# Patient Record
Sex: MALE | Race: WHITE | Employment: FULL TIME | URBAN - METROPOLITAN AREA
[De-identification: names, ages, dates, MRNs, and addresses within clinical notes are randomized per-mention and may not be internally consistent; named-entity substitution may affect disease eponyms.]

---

## 2020-02-07 ENCOUNTER — HOSPITAL ENCOUNTER (OUTPATIENT)
Dept: ULTRASOUND IMAGING | Age: 45
Discharge: HOME | End: 2020-02-07
Payer: COMMERCIAL

## 2020-02-07 PROCEDURE — 76705 ECHO EXAM OF ABDOMEN: CPT

## 2021-01-07 ENCOUNTER — HOSPITAL ENCOUNTER (INPATIENT)
Age: 46
LOS: 4 days | Discharge: HOME | DRG: 439 | End: 2021-01-11
Attending: EMERGENCY MEDICINE | Admitting: INTERNAL MEDICINE
Payer: COMMERCIAL

## 2021-01-07 ENCOUNTER — APPOINTMENT (OUTPATIENT)
Dept: CT IMAGING | Age: 46
DRG: 439 | End: 2021-01-07
Payer: COMMERCIAL

## 2021-01-07 ENCOUNTER — APPOINTMENT (OUTPATIENT)
Dept: ULTRASOUND IMAGING | Age: 46
DRG: 439 | End: 2021-01-07
Payer: COMMERCIAL

## 2021-01-07 DIAGNOSIS — K85.80 OTHER ACUTE PANCREATITIS WITHOUT INFECTION OR NECROSIS: ICD-10-CM

## 2021-01-07 DIAGNOSIS — R73.9 HYPERGLYCEMIA: ICD-10-CM

## 2021-01-07 DIAGNOSIS — K85.90 ACUTE PANCREATITIS, UNSPECIFIED COMPLICATION STATUS, UNSPECIFIED PANCREATITIS TYPE: Primary | ICD-10-CM

## 2021-01-07 PROBLEM — K85.00 IDIOPATHIC ACUTE PANCREATITIS: Status: ACTIVE | Noted: 2021-01-07

## 2021-01-07 LAB
A/G RATIO: 1.6 (ref 1.1–2.2)
ALBUMIN SERPL-MCNC: 4.6 G/DL (ref 3.4–5)
ALP BLD-CCNC: 98 U/L (ref 40–129)
ALT SERPL-CCNC: ABNORMAL U/L (ref 10–40)
ANION GAP SERPL CALCULATED.3IONS-SCNC: 18 MMOL/L (ref 3–16)
AST SERPL-CCNC: ABNORMAL U/L (ref 15–37)
BASOPHILS ABSOLUTE: 0.1 K/UL (ref 0–0.2)
BASOPHILS RELATIVE PERCENT: 0.6 %
BILIRUB SERPL-MCNC: 0.5 MG/DL (ref 0–1)
BUN BLDV-MCNC: 10 MG/DL (ref 7–20)
CALCIUM SERPL-MCNC: 9.3 MG/DL (ref 8.3–10.6)
CHLORIDE BLD-SCNC: 95 MMOL/L (ref 99–110)
CHOLESTEROL, TOTAL: 976 MG/DL (ref 0–199)
CO2: 17 MMOL/L (ref 21–32)
CREAT SERPL-MCNC: 0.6 MG/DL (ref 0.9–1.3)
EOSINOPHILS ABSOLUTE: 0 K/UL (ref 0–0.6)
EOSINOPHILS RELATIVE PERCENT: 0.3 %
GFR AFRICAN AMERICAN: >60
GFR NON-AFRICAN AMERICAN: >60
GLOBULIN: 2.9 G/DL
GLUCOSE BLD-MCNC: 242 MG/DL (ref 70–99)
GLUCOSE BLD-MCNC: 252 MG/DL (ref 70–99)
GLUCOSE BLD-MCNC: 272 MG/DL (ref 70–99)
GLUCOSE BLD-MCNC: 296 MG/DL (ref 70–99)
GLUCOSE BLD-MCNC: 329 MG/DL (ref 70–99)
HCT VFR BLD CALC: 48.4 % (ref 40.5–52.5)
HDLC SERPL-MCNC: 14 MG/DL (ref 40–60)
HEMOGLOBIN: 16.5 G/DL (ref 13.5–17.5)
LACTIC ACID: 1.4 MMOL/L (ref 0.4–2)
LDL CHOLESTEROL DIRECT: 28 MG/DL
LIPASE: 1299 U/L (ref 13–60)
LYMPHOCYTES ABSOLUTE: 1.5 K/UL (ref 1–5.1)
LYMPHOCYTES RELATIVE PERCENT: 10.4 %
MAGNESIUM: 2 MG/DL (ref 1.8–2.4)
MCH RBC QN AUTO: 28.5 PG (ref 26–34)
MCHC RBC AUTO-ENTMCNC: 34 G/DL (ref 31–36)
MCV RBC AUTO: 83.8 FL (ref 80–100)
MONOCYTES ABSOLUTE: 0.6 K/UL (ref 0–1.3)
MONOCYTES RELATIVE PERCENT: 4.2 %
NEUTROPHILS ABSOLUTE: 12.1 K/UL (ref 1.7–7.7)
NEUTROPHILS RELATIVE PERCENT: 84.5 %
PDW BLD-RTO: 13.5 % (ref 12.4–15.4)
PERFORMED ON: ABNORMAL
PHOSPHORUS: 3.5 MG/DL (ref 2.5–4.9)
PLATELET # BLD: 302 K/UL (ref 135–450)
PMV BLD AUTO: 7.5 FL (ref 5–10.5)
POTASSIUM REFLEX MAGNESIUM: 4.1 MMOL/L (ref 3.5–5.1)
RBC # BLD: 5.77 M/UL (ref 4.2–5.9)
SARS-COV-2, NAAT: NOT DETECTED
SODIUM BLD-SCNC: 130 MMOL/L (ref 136–145)
TOTAL PROTEIN: 7.5 G/DL (ref 6.4–8.2)
TRIGL SERPL-MCNC: >4425 MG/DL (ref 0–150)
WBC # BLD: 14.3 K/UL (ref 4–11)

## 2021-01-07 PROCEDURE — 36415 COLL VENOUS BLD VENIPUNCTURE: CPT

## 2021-01-07 PROCEDURE — 2500000003 HC RX 250 WO HCPCS: Performed by: EMERGENCY MEDICINE

## 2021-01-07 PROCEDURE — 6370000000 HC RX 637 (ALT 250 FOR IP): Performed by: INTERNAL MEDICINE

## 2021-01-07 PROCEDURE — U0002 COVID-19 LAB TEST NON-CDC: HCPCS

## 2021-01-07 PROCEDURE — 85025 COMPLETE CBC W/AUTO DIFF WBC: CPT

## 2021-01-07 PROCEDURE — 6360000002 HC RX W HCPCS: Performed by: PHYSICIAN ASSISTANT

## 2021-01-07 PROCEDURE — 99253 IP/OBS CNSLTJ NEW/EST LOW 45: CPT | Performed by: INTERNAL MEDICINE

## 2021-01-07 PROCEDURE — 96376 TX/PRO/DX INJ SAME DRUG ADON: CPT

## 2021-01-07 PROCEDURE — 96375 TX/PRO/DX INJ NEW DRUG ADDON: CPT

## 2021-01-07 PROCEDURE — 83036 HEMOGLOBIN GLYCOSYLATED A1C: CPT

## 2021-01-07 PROCEDURE — 99222 1ST HOSP IP/OBS MODERATE 55: CPT | Performed by: PHYSICIAN ASSISTANT

## 2021-01-07 PROCEDURE — 2580000003 HC RX 258: Performed by: INTERNAL MEDICINE

## 2021-01-07 PROCEDURE — 96374 THER/PROPH/DIAG INJ IV PUSH: CPT

## 2021-01-07 PROCEDURE — 6360000002 HC RX W HCPCS: Performed by: EMERGENCY MEDICINE

## 2021-01-07 PROCEDURE — 80053 COMPREHEN METABOLIC PANEL: CPT

## 2021-01-07 PROCEDURE — 6360000002 HC RX W HCPCS: Performed by: INTERNAL MEDICINE

## 2021-01-07 PROCEDURE — 83605 ASSAY OF LACTIC ACID: CPT

## 2021-01-07 PROCEDURE — 76705 ECHO EXAM OF ABDOMEN: CPT

## 2021-01-07 PROCEDURE — 74177 CT ABD & PELVIS W/CONTRAST: CPT

## 2021-01-07 PROCEDURE — 99284 EMERGENCY DEPT VISIT MOD MDM: CPT

## 2021-01-07 PROCEDURE — 84100 ASSAY OF PHOSPHORUS: CPT

## 2021-01-07 PROCEDURE — 82465 ASSAY BLD/SERUM CHOLESTEROL: CPT

## 2021-01-07 PROCEDURE — 1200000000 HC SEMI PRIVATE

## 2021-01-07 PROCEDURE — 83718 ASSAY OF LIPOPROTEIN: CPT

## 2021-01-07 PROCEDURE — 83690 ASSAY OF LIPASE: CPT

## 2021-01-07 PROCEDURE — 83721 ASSAY OF BLOOD LIPOPROTEIN: CPT

## 2021-01-07 PROCEDURE — C9113 INJ PANTOPRAZOLE SODIUM, VIA: HCPCS | Performed by: INTERNAL MEDICINE

## 2021-01-07 PROCEDURE — 6360000004 HC RX CONTRAST MEDICATION: Performed by: EMERGENCY MEDICINE

## 2021-01-07 PROCEDURE — 84478 ASSAY OF TRIGLYCERIDES: CPT

## 2021-01-07 PROCEDURE — 83735 ASSAY OF MAGNESIUM: CPT

## 2021-01-07 PROCEDURE — 2580000003 HC RX 258: Performed by: EMERGENCY MEDICINE

## 2021-01-07 RX ORDER — 0.9 % SODIUM CHLORIDE 0.9 %
1000 INTRAVENOUS SOLUTION INTRAVENOUS ONCE
Status: COMPLETED | OUTPATIENT
Start: 2021-01-07 | End: 2021-01-07

## 2021-01-07 RX ORDER — ACETAMINOPHEN 325 MG/1
650 TABLET ORAL EVERY 4 HOURS PRN
Status: DISCONTINUED | OUTPATIENT
Start: 2021-01-07 | End: 2021-01-11

## 2021-01-07 RX ORDER — MAGNESIUM SULFATE 1 G/100ML
1 INJECTION INTRAVENOUS PRN
Status: DISCONTINUED | OUTPATIENT
Start: 2021-01-07 | End: 2021-01-11 | Stop reason: HOSPADM

## 2021-01-07 RX ORDER — ONDANSETRON 2 MG/ML
4 INJECTION INTRAMUSCULAR; INTRAVENOUS EVERY 6 HOURS PRN
Status: DISCONTINUED | OUTPATIENT
Start: 2021-01-07 | End: 2021-01-11 | Stop reason: HOSPADM

## 2021-01-07 RX ORDER — ONDANSETRON 2 MG/ML
4 INJECTION INTRAMUSCULAR; INTRAVENOUS EVERY 6 HOURS PRN
Status: DISCONTINUED | OUTPATIENT
Start: 2021-01-07 | End: 2021-01-07

## 2021-01-07 RX ORDER — ONDANSETRON 2 MG/ML
4 INJECTION INTRAMUSCULAR; INTRAVENOUS ONCE
Status: COMPLETED | OUTPATIENT
Start: 2021-01-07 | End: 2021-01-07

## 2021-01-07 RX ORDER — SODIUM CHLORIDE 0.9 % (FLUSH) 0.9 %
10 SYRINGE (ML) INJECTION EVERY 12 HOURS SCHEDULED
Status: DISCONTINUED | OUTPATIENT
Start: 2021-01-07 | End: 2021-01-11 | Stop reason: HOSPADM

## 2021-01-07 RX ORDER — SODIUM CHLORIDE, SODIUM LACTATE, POTASSIUM CHLORIDE, CALCIUM CHLORIDE 600; 310; 30; 20 MG/100ML; MG/100ML; MG/100ML; MG/100ML
INJECTION, SOLUTION INTRAVENOUS CONTINUOUS
Status: DISCONTINUED | OUTPATIENT
Start: 2021-01-07 | End: 2021-01-10

## 2021-01-07 RX ORDER — PANTOPRAZOLE SODIUM 40 MG/10ML
40 INJECTION, POWDER, LYOPHILIZED, FOR SOLUTION INTRAVENOUS DAILY
Status: DISCONTINUED | OUTPATIENT
Start: 2021-01-07 | End: 2021-01-11 | Stop reason: HOSPADM

## 2021-01-07 RX ORDER — NICOTINE POLACRILEX 4 MG
15 LOZENGE BUCCAL PRN
Status: DISCONTINUED | OUTPATIENT
Start: 2021-01-07 | End: 2021-01-08

## 2021-01-07 RX ORDER — DEXTROSE MONOHYDRATE 50 MG/ML
100 INJECTION, SOLUTION INTRAVENOUS PRN
Status: DISCONTINUED | OUTPATIENT
Start: 2021-01-07 | End: 2021-01-08

## 2021-01-07 RX ORDER — MORPHINE SULFATE 4 MG/ML
4 INJECTION, SOLUTION INTRAMUSCULAR; INTRAVENOUS
Status: DISCONTINUED | OUTPATIENT
Start: 2021-01-07 | End: 2021-01-07

## 2021-01-07 RX ORDER — ESOMEPRAZOLE MAGNESIUM 20 MG/1
20 FOR SUSPENSION ORAL DAILY
COMMUNITY

## 2021-01-07 RX ORDER — SODIUM CHLORIDE 0.9 % (FLUSH) 0.9 %
10 SYRINGE (ML) INJECTION PRN
Status: DISCONTINUED | OUTPATIENT
Start: 2021-01-07 | End: 2021-01-11 | Stop reason: HOSPADM

## 2021-01-07 RX ORDER — DEXTROSE MONOHYDRATE 25 G/50ML
12.5 INJECTION, SOLUTION INTRAVENOUS PRN
Status: DISCONTINUED | OUTPATIENT
Start: 2021-01-07 | End: 2021-01-08

## 2021-01-07 RX ORDER — SODIUM CHLORIDE, SODIUM LACTATE, POTASSIUM CHLORIDE, CALCIUM CHLORIDE 600; 310; 30; 20 MG/100ML; MG/100ML; MG/100ML; MG/100ML
INJECTION, SOLUTION INTRAVENOUS CONTINUOUS
Status: DISPENSED | OUTPATIENT
Start: 2021-01-07 | End: 2021-01-07

## 2021-01-07 RX ORDER — MORPHINE SULFATE 4 MG/ML
6 INJECTION, SOLUTION INTRAMUSCULAR; INTRAVENOUS ONCE
Status: COMPLETED | OUTPATIENT
Start: 2021-01-07 | End: 2021-01-07

## 2021-01-07 RX ORDER — SODIUM CHLORIDE 9 MG/ML
1000 INJECTION, SOLUTION INTRAVENOUS CONTINUOUS
Status: DISCONTINUED | OUTPATIENT
Start: 2021-01-07 | End: 2021-01-07

## 2021-01-07 RX ADMIN — Medication 10 ML: at 08:24

## 2021-01-07 RX ADMIN — INSULIN LISPRO 2 UNITS: 100 INJECTION, SOLUTION INTRAVENOUS; SUBCUTANEOUS at 16:26

## 2021-01-07 RX ADMIN — SODIUM CHLORIDE 1000 ML: 9 INJECTION, SOLUTION INTRAVENOUS at 04:54

## 2021-01-07 RX ADMIN — ONDANSETRON HYDROCHLORIDE 4 MG: 2 INJECTION, SOLUTION INTRAMUSCULAR; INTRAVENOUS at 03:15

## 2021-01-07 RX ADMIN — HYDROMORPHONE HYDROCHLORIDE 1 MG: 1 INJECTION, SOLUTION INTRAMUSCULAR; INTRAVENOUS; SUBCUTANEOUS at 13:52

## 2021-01-07 RX ADMIN — PANTOPRAZOLE SODIUM 40 MG: 40 INJECTION, POWDER, FOR SOLUTION INTRAVENOUS at 08:29

## 2021-01-07 RX ADMIN — HYDROMORPHONE HYDROCHLORIDE 1 MG: 1 INJECTION, SOLUTION INTRAMUSCULAR; INTRAVENOUS; SUBCUTANEOUS at 08:21

## 2021-01-07 RX ADMIN — INSULIN LISPRO 3 UNITS: 100 INJECTION, SOLUTION INTRAVENOUS; SUBCUTANEOUS at 08:41

## 2021-01-07 RX ADMIN — HYDROMORPHONE HYDROCHLORIDE 1 MG: 1 INJECTION, SOLUTION INTRAMUSCULAR; INTRAVENOUS; SUBCUTANEOUS at 23:57

## 2021-01-07 RX ADMIN — INSULIN LISPRO 3 UNITS: 100 INJECTION, SOLUTION INTRAVENOUS; SUBCUTANEOUS at 22:15

## 2021-01-07 RX ADMIN — SODIUM CHLORIDE, POTASSIUM CHLORIDE, SODIUM LACTATE AND CALCIUM CHLORIDE: 600; 310; 30; 20 INJECTION, SOLUTION INTRAVENOUS at 13:58

## 2021-01-07 RX ADMIN — SODIUM CHLORIDE, POTASSIUM CHLORIDE, SODIUM LACTATE AND CALCIUM CHLORIDE: 600; 310; 30; 20 INJECTION, SOLUTION INTRAVENOUS at 20:54

## 2021-01-07 RX ADMIN — HYDROMORPHONE HYDROCHLORIDE 1 MG: 1 INJECTION, SOLUTION INTRAMUSCULAR; INTRAVENOUS; SUBCUTANEOUS at 18:52

## 2021-01-07 RX ADMIN — SODIUM CHLORIDE 1000 ML: 9 INJECTION, SOLUTION INTRAVENOUS at 03:15

## 2021-01-07 RX ADMIN — MORPHINE SULFATE 4 MG: 4 INJECTION INTRAVENOUS at 05:26

## 2021-01-07 RX ADMIN — IOPAMIDOL 75 ML: 755 INJECTION, SOLUTION INTRAVENOUS at 04:26

## 2021-01-07 RX ADMIN — SODIUM CHLORIDE, POTASSIUM CHLORIDE, SODIUM LACTATE AND CALCIUM CHLORIDE: 600; 310; 30; 20 INJECTION, SOLUTION INTRAVENOUS at 08:29

## 2021-01-07 RX ADMIN — INSULIN LISPRO 3 UNITS: 100 INJECTION, SOLUTION INTRAVENOUS; SUBCUTANEOUS at 12:56

## 2021-01-07 RX ADMIN — ENOXAPARIN SODIUM 40 MG: 40 INJECTION SUBCUTANEOUS at 08:29

## 2021-01-07 RX ADMIN — FAMOTIDINE 20 MG: 10 INJECTION, SOLUTION INTRAVENOUS at 03:15

## 2021-01-07 RX ADMIN — MORPHINE SULFATE 6 MG: 4 INJECTION INTRAVENOUS at 03:15

## 2021-01-07 ASSESSMENT — PAIN - FUNCTIONAL ASSESSMENT: PAIN_FUNCTIONAL_ASSESSMENT: PREVENTS OR INTERFERES SOME ACTIVE ACTIVITIES AND ADLS

## 2021-01-07 ASSESSMENT — PAIN DESCRIPTION - PROGRESSION
CLINICAL_PROGRESSION: GRADUALLY WORSENING
CLINICAL_PROGRESSION: GRADUALLY WORSENING

## 2021-01-07 ASSESSMENT — PAIN DESCRIPTION - ORIENTATION: ORIENTATION: LOWER

## 2021-01-07 ASSESSMENT — PAIN SCALES - GENERAL
PAINLEVEL_OUTOF10: 7
PAINLEVEL_OUTOF10: 8
PAINLEVEL_OUTOF10: 9
PAINLEVEL_OUTOF10: 6
PAINLEVEL_OUTOF10: 7
PAINLEVEL_OUTOF10: 8

## 2021-01-07 ASSESSMENT — PAIN DESCRIPTION - LOCATION
LOCATION: ABDOMEN
LOCATION: ABDOMEN

## 2021-01-07 ASSESSMENT — PAIN DESCRIPTION - FREQUENCY: FREQUENCY: INTERMITTENT

## 2021-01-07 ASSESSMENT — PAIN DESCRIPTION - PAIN TYPE
TYPE: ACUTE PAIN
TYPE: ACUTE PAIN

## 2021-01-07 ASSESSMENT — PAIN DESCRIPTION - DESCRIPTORS: DESCRIPTORS: THROBBING

## 2021-01-07 NOTE — CONSULTS
[address] [address]    [address] [facility] ,  [address] [facility]  Phone: [phone]   SNU:[phone]  [address] [place] [address] [address],  [address] [facility] Nabila Brandon  Phone: [phone]   DTE:[phone]    Gastroenterology H&P/Consult Note    Chief Complaint   Patient presents with    Abdominal Pain     upper abd pain that started approx 1500 yesterday. denies nausea or vomiting       HPI     Thank you No ref. provider found and Oscar Mills MD for asking me to see Israel Campo in consultation. He is a 39y.o. year old malewith medical history of obesity (BMI 35), diabetes mellitus type 2 diet controlled and GERD presents with epigastric abdominal pain of 1 day duration. Abdominal pain is in the epigastrium, constant, nonradiating and associated with nausea. Denies vomiting, fever, chills, unintentional weight loss, constipation, diarrhea, hematochezia or melenic stools. He reports occasional alcohol use -2 beers twice a week. He denies previous episodes of acute pancreatitis, NSAID use or medications except for Nexium as needed. He denies family history of pancreatitis or pancreatic cancer. Work-up has noted normal liver chemistries, elevated lipase 1300, leukocytosis WBC 14.3, hyperglycemia glucose 329, elevated anion gap 18, hemoglobin 16.5, hematocrit 48.4 platelets 340. COVID-19 negative. CT abdomen pelvis on 1/7/2021 acute pancreatitis with peripancreatic fluid and fat stranding. Fatty infiltration of liver and diverticulosis without diverticulitis. Ultrasound right upper quadrant on 1/7/2021 noted fatty infiltration of liver, normal gallbladder, normal common bile duct to 5 mm. GI has been consulted for further management of acute pancreatitis. Date of Admission:  1/7/2021  Date of Consultation:  1/7/2021      Last Encounter Reviewed: None  Pertinent PMH, FH, SH is reviewed below.   Last EGD: None  Last Colonoscopy: None    Health Maintenance   Topic Date Due    Hepatitis C screen  1975    HIV screen  05/07/1992    Lipid screen  05/07/2017    Diabetes screen  05/07/2017    Flu vaccine (1) 09/01/2020    DTaP/Tdap/Td vaccine (2 - Td) 02/05/2025    Hepatitis A vaccine  Aged Out    Hepatitis B vaccine  Aged Out    Hib vaccine  Aged Out    Meningococcal (ACWY) vaccine  Aged Out    Pneumococcal 0-64 years Vaccine  Aged Out     PAST MEDICAL HISTORY     Past Medical History:   Diagnosis Date    Diabetes mellitus ([address]     diet controlled     FAMILY HISTORY   No family history on file. SOCIAL HISTORY     Social History     Tobacco Use    Smoking status: Never Smoker   Substance Use Topics    Alcohol use: Yes     Comment: occasional    Drug use: Not Currently     SURGICAL HISTORY   No past surgical history on file. ALLERGIES     Allergies   Allergen Reactions    Penicillins Hives     CURRENT MEDICATIONS      pantoprazole  40 mg Intravenous Daily    sodium chloride flush  10 mL Intravenous 2 times per day    enoxaparin  40 mg Subcutaneous Daily    insulin lispro  0-6 Units Subcutaneous Q4H      lactated ringers 250 mL/hr at 01/07/21 9022    Followed by   Reyes lactated ringers      dextrose       sodium chloride flush, magnesium sulfate, acetaminophen, ondansetron, glucose, dextrose, glucagon (rDNA), dextrose, HYDROmorphone **OR** HYDROmorphone  HOME MEDICATIONS  [unfilled]  IMMUNIZATIONS     There is no immunization history on file for this patient. REVIEW OF SYSTEMS   See HPI for further details and pertinent postiives. Negative for the following:  Constitutional: Negative for weight change. Negative for appetite change and fatigue. HENT: Negative for nosebleeds, sore throat, mouth sores, trouble swallowing and voice change. Respiratory: Negative for cough, choking and chest tightness. Cardiovascular: Negative for chest pain   Gastrointestinal: See HPI  Musculoskeletal: Negative for arthralgias. Skin: Negative for pallor.    Neurological: Negative for weakness and light-headedness. Hematological: Negative for adenopathy. Does not bruise/bleed easily. Psychiatric/Behavioral: Negative for suicidal ideas. PHYSICAL EXAM   VITAL SIGNS: /82   Pulse 97   Temp 99.3 °F (37.4 °C) (Oral)   Resp 14   Ht 5' 8\" (1.727 m)   Wt 230 lb (104.3 kg)   SpO2 94%   BMI 34.97 kg/m²   With regards to weight, he reports stable / unchanged. Review of available records reveals: Wt Readings from Last 50 Encounters:   01/07/21 230 lb (104.3 kg)     Constitutional: Obese male, well developed, Well nourished, No acute distress, Non-toxic appearance. HENT: Normocephalic, Atraumatic, Bilateral external ears normal, Oropharynx moist, No oral exudates, Nose normal.   Eyes: Conjunctiva normal, No discharge. Neck: Normal range of motion, No tenderness, Supple, No stridor. Lymphatic: No cervical, subclavian, or axillary lymphadenopathy. Cardiovascular: Normal heart rate, Normal rhythm, No murmurs, No rubs, No gallops. Thorax & Lungs: Normal breath sounds, No respiratory distress, No wheezing, No chest tenderness. No gynecomastia. Abdomen: Pannus abdomen, normal bowel sounds, soft, mild epigastric tenderness, no hernias   Rectal:  Deferred. Skin: Warm, Dry, No erythema, No rash. No bruising. No spider hemangiomas. Back: No tenderness, No CVA tenderness. Lower Extremities: Intact distal pulses, No edema, No tenderness, No cyanosis, No clubbing. Neurologic: Alert & oriented x 3, Normal motor function, Normal sensory function, No focal deficits noted. RADIOLOGY/PROCEDURES     Last PALAT CXR:   Results for orders placed during the hospital encounter of 07/01/16   XR Chest Standard TWO VW    Narrative EXAMINATION:  TWO VIEWS OF THE CHEST    7/1/2016 4:54 pm    COMPARISON:  None.     HISTORY:  ORDERING SYSTEM PROVIDED HISTORY: Chest wall pain  TECHNOLOGIST PROVIDED HISTORY:  Ordering Physician Provided Reason for Exam: chest wall pain  Acuity: Acute  Type of Exam: Initial  Additional signs and symptoms: chest wall pain for a couple weeks    FINDINGS:  The cardiac silhouette, mediastinal and hilar contours are normal.  The lungs  are clear. There are no pleural effusions. No acute osseous abnormalities  are identified. Impression No acute cardiopulmonary disease. Results for orders placed during the hospital encounter of 01/07/21   CT ABDOMEN PELVIS W IV CONTRAST Additional Contrast? None    Narrative EXAMINATION:  CT OF THE ABDOMEN AND PELVIS WITH CONTRAST 1/7/2021 4:26 am    TECHNIQUE:  CT of the abdomen and pelvis was performed with the administration of  intravenous contrast. Multiplanar reformatted images are provided for review. Dose modulation, iterative reconstruction, and/or weight based adjustment of  the mA/kV was utilized to reduce the radiation dose to as low as reasonably  achievable. COMPARISON:  None. HISTORY:  ORDERING SYSTEM PROVIDED HISTORY: Epigastric pain x 12 hours, mild RUQ/LUQ  pain  TECHNOLOGIST PROVIDED HISTORY:  Reason for exam:->Epigastric pain x 12 hours, mild RUQ/LUQ pain  Additional Contrast?->None  Reason for Exam: Abdominal Pain (upper abd pain that started approx 1500  yesterday. denies nausea or vomiting    FINDINGS:  Lower Chest: The visualized lung bases are clear. Organs: The pancreas enhances normally though there is peripancreatic fluid  and fat stranding. No well-defined fluid collection is seen. There is fatty  infiltration of the liver. GI/Bowel: Small bowel caliber is normal.  The appendix is normal.  There are  sigmoid colon diverticula without evidence for diverticulitis. Pelvis: The bladder is grossly negative. Peritoneum/Retroperitoneum: As described above there is peripancreatic fat  stranding. There is no free fluid in the pelvis. There is no adenopathy. The aorta is unremarkable. Bones/Soft Tissues: No acute findings. Impression 1. Acute pancreatitis.   2. Diverticulosis without scan evidence for diverticulitis. COURSE & MEDICAL DECISION MAKING   (See epic chart for additional details including stool tests, total bilirubin, viral loads, procedures, and pathology)  Lab Results   Component Value Date    WBC 14.3 (H) 01/07/2021    HGB 16.5 01/07/2021    HCT 48.4 01/07/2021     01/07/2021    ALT see below 01/07/2021    AST see below 01/07/2021     (L) 01/07/2021    K 4.1 01/07/2021    CL 95 (L) 01/07/2021    CREATININE 0.6 (L) 01/07/2021    BUN 10 01/07/2021    CO2 17 (L) 01/07/2021     No results found for: BILIDIR  Lab Results   Component Value Date    PHOS 3.5 01/07/2021     Lab Results   Component Value Date    LABALBU 4.6 01/07/2021    ALKPHOS 98 01/07/2021    ALT see below 01/07/2021    AST see below 01/07/2021    BILITOT 0.5 01/07/2021     Lab Results   Component Value Date    LIPASE 1,299.0 (H) 01/07/2021     FINAL IMPRESSION/ASSESSMENT/PLAN       1. Acute pancreatitis - BISAP score 1; Ruled out biliary pancreatitis; less likely alcoholic pancreatitis. Continue IV fluids with lactated Ringer's at 250 ml/h and then 150 ml/h   N.p.o. with ice chips and sips of water   Pain control  Lipid panel pending. 2.  GERD  Continue Protonix 40 mg once daily    GI to follow      1. The patient indicates understanding of these issues and agrees with the plan. 2.  I reviewed the patient's medical information and medical history. 3.  I have reviewed the past medical, family, and social history sections including the medications and allergies listed in the above medical record. Thank you for involving [facility] Gastroenterology in [facility] care. For further questions or concerns, we can best be reached through perfect serv.         Heena Greer 1/7/21 9:53 AM EST    [facility]   Phone [phone]   Fax [phone]

## 2021-01-07 NOTE — H&P
Hospital Medicine History & Physical      PCP: Carmela Simmons MD    Date of Admission: 1/7/2021    Date of Service: Pt seen/examined on 1/7/2020    Chief Complaint:    Chief Complaint   Patient presents with    Abdominal Pain     upper abd pain that started approx 1500 yesterday. denies nausea or vomiting         History Of Present Illness: The patient is a 39 y.o. male with a PMH of Type II DM - diet controlled who presented to the ED with complaint of epigastric abdominal pain that started around 330 AM on 1/7/2021 with associated nausea. No emesis, diarrhea, constipation, melena or hematochezia. No exacerbating or alleviating factors. Last BM was this morning. Denies any hx of PUD, pancreatitis or similar symptoms. He drinks alcohol \"on occasion\" but not regularly. Denies any knowledge of hypertriglyceridemia. He does have a gallbladder but denies any hx of cholelithiasis. He is pre-diabetic which he reports is managed by diet. He does not smoke. The only medication he takes is Nexium. Past Medical History:        Diagnosis Date    Diabetes mellitus ([address]     diet controlled       Past Surgical History:    No past surgical history on file. Medications Prior to Admission:    Prior to Admission medications    Medication Sig Start Date End Date Taking? Authorizing Provider   esomeprazole Magnesium (NEXIUM) 20 MG PACK Take 20 mg by mouth daily   Yes Historical Provider, MD       Allergies:  Penicillins    Social History:  The patient currently lives at home. TOBACCO:   reports that he has never smoked. He does not have any smokeless tobacco history on file. ETOH:   reports current alcohol use. Family History:   Positive as follows:    No family history on file.     REVIEW OF SYSTEMS:     Constitutional: Negative for fever   HENT: Negative for sore throat   Eyes: Negative for redness   Respiratory: Negative  for dyspnea, cough   Cardiovascular: Negative for chest pain Gastrointestinal: Negative for vomiting, diarrhea, + epigastric abdominal pain w/ nausea   Genitourinary: Negative for hematuria   Musculoskeletal: Negative for arthralgias   Skin: Negative for rash   Neurological: Negative for syncope   Hematological: Negative for adenopathy   Psychiatric/Behavorial: Negative for anxiety    PHYSICAL EXAM:    BP (!) 142/91   Pulse 78   Temp 98.6 °F (37 °C) (Oral)   Resp 16   Ht 5' 8\" (1.727 m)   Wt 230 lb (104.3 kg)   SpO2 94%   BMI 34.97 kg/m²   Gen: No distress. Alert. Middle aged  male, obese, appears uncomfortable with frequent positional changes  Eyes: No sclera icterus. No conjunctival injection. Glasses   Neck: Trachea midline. Resp: No accessory muscle use. No crackles. No wheezes. No rhonchi. On RA  CV: Regular rate. Regular rhythm. No murmur. No rub. No edema. Peripheral Pulses: +2 palpable, equal bilaterally   GI: Soft, obese, diffusely tender - greatest in epigastric region. Non-distended. Normal bowel sounds. Skin: Warm and dry. No rash on exposed extremities. Neuro: Awake. Grossly nonfocal, no aphasia, follows commands, moves extremities symmetrically. Psych: Oriented x 3. No anxiety or agitation. CBC:   Recent Labs     01/07/21  0306   WBC 14.3*   HGB 16.5   HCT 48.4   MCV 83.8        BMP:   Recent Labs     01/07/21  0306   *   K 4.1   CL 95*   CO2 17*   BUN 10   CREATININE 0.6*     LIVER PROFILE:   Recent Labs     01/07/21  0306   AST see below   ALT see below   LIPASE 1,299.0*   BILITOT 0.5   ALKPHOS 98     CULTURES  None    RADIOLOGY    CT ABDOMEN PELVIS W IV CONTRAST Additional Contrast? None 1/7/2020   Final Result   1. Acute pancreatitis. 2. Diverticulosis without scan evidence for diverticulitis.            ASSESSMENT/PLAN:    Acute Pancreatitis  - denies alcohol abuse  - Admit to Med Surg  - check RUQ U/S, triglycerides - pending  - medications reviewed  - NPO, IVF, PRN Dilaudid, PRN Zofran  - GI consult    Hyponatremia  - in setting of hyperglycemia  - corrects to 134  - mgmt of sugars as below, IVF  - repeat BMP tomorrow    Leukocytosis  - w/ no obvious source of infection  - favor reactive  - repeat CBC tomorrow    Hyperglycemia  Pre-DM  - check Hgb A1c - pending  - add low dose SSI  - diet: currently NPO    GERD  - cont PPI    Obesity  - Body mass index is 34.97 kg/m². - Counseled on weight loss.      RAPID COVID TEST WAS NEGATIVE    DVT Prophylaxis: Lovenox  Diet: Diet NPO Effective Now Exceptions are: Ice Chips  Code Status: Full Code    Aleks Morris PA-C 8:28 AM 1/7/2021

## 2021-01-07 NOTE — ED PROVIDER NOTES
CHIEF COMPLAINT  Abdominal Pain (upper abd pain that started approx 1500 yesterday. denies nausea or vomiting)      HISTORY OF PRESENT ILLNESS  Iris Gomes is a 39 y.o. male who presents to the ED with upper abdominal pain that started yesterday afternoon. Denies nausea or vomiting. Says that during the middle of his upper abdomen. Does not radiate. It is very dull and achy and constant. Patient does report a history of diabetes that he is controlling \"with diet\". No other complaints, modifying factors or associated symptoms. I have reviewed the following from the nursing documentation. No past medical history on file. No past surgical history on file. No family history on file.   Social History     Socioeconomic History    Marital status:      Spouse name: Not on file    Number of children: Not on file    Years of education: Not on file    Highest education level: Not on file   Occupational History    Not on file   Social Needs    Financial resource strain: Not on file    Food insecurity     Worry: Not on file     Inability: Not on file    Transportation needs     Medical: Not on file     Non-medical: Not on file   Tobacco Use    Smoking status: Never Smoker   Substance and Sexual Activity    Alcohol use: Yes     Comment: occasional    Drug use: Not on file    Sexual activity: Not on file   Lifestyle    Physical activity     Days per week: Not on file     Minutes per session: Not on file    Stress: Not on file   Relationships    Social connections     Talks on phone: Not on file     Gets together: Not on file     Attends Confucianism service: Not on file     Active member of club or organization: Not on file     Attends meetings of clubs or organizations: Not on file     Relationship status: Not on file    Intimate partner violence     Fear of current or ex partner: Not on file     Emotionally abused: Not on file     Physically abused: Not on file     Forced sexual activity: % 0.6 %    Neutrophils Absolute 12.1 (H) 1.7 - 7.7 K/uL    Lymphocytes Absolute 1.5 1.0 - 5.1 K/uL    Monocytes Absolute 0.6 0.0 - 1.3 K/uL    Eosinophils Absolute 0.0 0.0 - 0.6 K/uL    Basophils Absolute 0.1 0.0 - 0.2 K/uL   Comprehensive Metabolic Panel w/ Reflex to MG   Result Value Ref Range    Sodium 130 (L) 136 - 145 mmol/L    Potassium reflex Magnesium 4.1 3.5 - 5.1 mmol/L    Chloride 95 (L) 99 - 110 mmol/L    CO2 17 (L) 21 - 32 mmol/L    Anion Gap 18 (H) 3 - 16    Glucose 329 (H) 70 - 99 mg/dL    BUN 10 7 - 20 mg/dL    CREATININE 0.6 (L) 0.9 - 1.3 mg/dL    GFR Non-African American >60 >60    GFR African American >60 >60    Calcium 9.3 8.3 - 10.6 mg/dL    Total Protein 7.5 6.4 - 8.2 g/dL    Alb 4.6 3.4 - 5.0 g/dL    Albumin/Globulin Ratio 1.6 1.1 - 2.2    Total Bilirubin 0.5 0.0 - 1.0 mg/dL    Alkaline Phosphatase 98 40 - 129 U/L    ALT see below 10 - 40 U/L    AST see below 15 - 37 U/L    Globulin 2.9 g/dL   Lipase   Result Value Ref Range    Lipase 1,299.0 (H) 13.0 - 60.0 U/L   Lactic Acid, Plasma   Result Value Ref Range    Lactic Acid 1.4 0.4 - 2.0 mmol/L       RADIOLOGY  X-RAYS:  I have reviewed radiologic plain film image(s). ALL OTHER NON-PLAIN FILM IMAGES SUCH AS CT, ULTRASOUND AND MRI HAVE BEEN READ BY THE RADIOLOGIST. CT ABDOMEN PELVIS W IV CONTRAST Additional Contrast? None   Final Result   1. Acute pancreatitis. 2. Diverticulosis without scan evidence for diverticulitis. ED COURSE/MDM  Patient seen and evaluated. Lab work here shows elevated white blood cell count of 14.3 also noted elevated glucose of 329 and a lipase of 1,299. Lactic acid is 1.4. AST and ALT of the chemistry is not been resulted this is likely due to significant lipemia of the patient's blood, I do have concern for hypertriglyceridemia in this patient is likely causing his pancreatitis. No known history of any cholesterol or triglyceride issues.   He denies any history of alcohol abuse and has not had any recent alcoholic beverages. 6961: CT scan unsurprisingly shows fat stranding around the pancreas no areas of necrosis or abscess or pseudocyst.  Will admit to the hospitalist for admission. All diagnostic tests reviewed and results discussed with patient. Plan of care discussed with patient. Patient in agreement with plan. CLINICAL IMPRESSION  1. Acute pancreatitis, unspecified complication status, unspecified pancreatitis type    2. Hyperglycemia        Blood pressure (!) 169/108, pulse 98, temperature 98.6 °F (37 °C), temperature source Oral, resp. rate 16, height 5' 8\" (1.727 m), weight 230 lb (104.3 kg), SpO2 98 %. DISPOSITION  Cooper Swenson was admitted in stable condition. This chart was generated in part by using Dragon Dictation system and may contain errors related to that system including errors in grammar, punctuation, and spelling, as well as words and phrases that may be inappropriate. When dictating, effort is made to correct spelling/grammar errors. If there are any questions or concerns please feel free to contact the dictating provider for clarification.      Waymond Apgar,   ATTENDING, [address] [facility], [place]  [address], [place]  [address]

## 2021-01-07 NOTE — ED NOTES
6107- perfect serve to Dr. Christina Miller for admission consult per Dr. Fausto Laguna.     [facility]  01/07/21 5165

## 2021-01-08 ENCOUNTER — APPOINTMENT (OUTPATIENT)
Dept: INTERVENTIONAL RADIOLOGY/VASCULAR | Age: 46
DRG: 439 | End: 2021-01-08
Payer: COMMERCIAL

## 2021-01-08 LAB
A/G RATIO: 1.1 (ref 1.1–2.2)
ALBUMIN SERPL-MCNC: 3.3 G/DL (ref 3.4–5)
ALP BLD-CCNC: 68 U/L (ref 40–129)
ALT SERPL-CCNC: ABNORMAL U/L (ref 10–40)
ANION GAP SERPL CALCULATED.3IONS-SCNC: 11 MMOL/L (ref 3–16)
ANION GAP SERPL CALCULATED.3IONS-SCNC: 15 MMOL/L (ref 3–16)
ANION GAP SERPL CALCULATED.3IONS-SCNC: 9 MMOL/L (ref 3–16)
AST SERPL-CCNC: ABNORMAL U/L (ref 15–37)
BASOPHILS ABSOLUTE: 0 K/UL (ref 0–0.2)
BASOPHILS RELATIVE PERCENT: 0.2 %
BILIRUB SERPL-MCNC: 0.6 MG/DL (ref 0–1)
BUN BLDV-MCNC: 8 MG/DL (ref 7–20)
BUN BLDV-MCNC: 9 MG/DL (ref 7–20)
BUN BLDV-MCNC: 9 MG/DL (ref 7–20)
CALCIUM SERPL-MCNC: 8.3 MG/DL (ref 8.3–10.6)
CALCIUM SERPL-MCNC: 8.5 MG/DL (ref 8.3–10.6)
CALCIUM SERPL-MCNC: 8.7 MG/DL (ref 8.3–10.6)
CHLORIDE BLD-SCNC: 92 MMOL/L (ref 99–110)
CHLORIDE BLD-SCNC: 94 MMOL/L (ref 99–110)
CHLORIDE BLD-SCNC: 96 MMOL/L (ref 99–110)
CO2: 17 MMOL/L (ref 21–32)
CO2: 20 MMOL/L (ref 21–32)
CO2: 22 MMOL/L (ref 21–32)
CREAT SERPL-MCNC: <0.5 MG/DL (ref 0.9–1.3)
EOSINOPHILS ABSOLUTE: 0.1 K/UL (ref 0–0.6)
EOSINOPHILS RELATIVE PERCENT: 0.8 %
ESTIMATED AVERAGE GLUCOSE: 208.7 MG/DL
FIBRINOGEN: 874 MG/DL (ref 200–397)
GFR AFRICAN AMERICAN: >60
GFR NON-AFRICAN AMERICAN: >60
GLOBULIN: 3 G/DL
GLUCOSE BLD-MCNC: 201 MG/DL (ref 70–99)
GLUCOSE BLD-MCNC: 218 MG/DL (ref 70–99)
GLUCOSE BLD-MCNC: 220 MG/DL (ref 70–99)
GLUCOSE BLD-MCNC: 226 MG/DL (ref 70–99)
GLUCOSE BLD-MCNC: 233 MG/DL (ref 70–99)
GLUCOSE BLD-MCNC: 235 MG/DL (ref 70–99)
GLUCOSE BLD-MCNC: 235 MG/DL (ref 70–99)
GLUCOSE BLD-MCNC: 246 MG/DL (ref 70–99)
GLUCOSE BLD-MCNC: 247 MG/DL (ref 70–99)
HBA1C MFR BLD: 8.9 %
HCT VFR BLD CALC: 46.4 % (ref 40.5–52.5)
HEMOGLOBIN: 15.8 G/DL (ref 13.5–17.5)
INR BLD: 1.17 (ref 0.86–1.14)
LYMPHOCYTES ABSOLUTE: 1.1 K/UL (ref 1–5.1)
LYMPHOCYTES RELATIVE PERCENT: 9 %
MCH RBC QN AUTO: 29.4 PG (ref 26–34)
MCHC RBC AUTO-ENTMCNC: 34.1 G/DL (ref 31–36)
MCV RBC AUTO: 86.3 FL (ref 80–100)
MONOCYTES ABSOLUTE: 0.8 K/UL (ref 0–1.3)
MONOCYTES RELATIVE PERCENT: 6.3 %
NEUTROPHILS ABSOLUTE: 10 K/UL (ref 1.7–7.7)
NEUTROPHILS RELATIVE PERCENT: 83.7 %
PDW BLD-RTO: 13.6 % (ref 12.4–15.4)
PERFORMED ON: ABNORMAL
PHOSPHORUS: 1.5 MG/DL (ref 2.5–4.9)
PHOSPHORUS: 1.6 MG/DL (ref 2.5–4.9)
PLATELET # BLD: 212 K/UL (ref 135–450)
PMV BLD AUTO: 8.1 FL (ref 5–10.5)
POTASSIUM REFLEX MAGNESIUM: 3.9 MMOL/L (ref 3.5–5.1)
POTASSIUM SERPL-SCNC: 3.6 MMOL/L (ref 3.5–5.1)
POTASSIUM SERPL-SCNC: 3.7 MMOL/L (ref 3.5–5.1)
PROTHROMBIN TIME: 13.6 SEC (ref 10–13.2)
RBC # BLD: 5.38 M/UL (ref 4.2–5.9)
SODIUM BLD-SCNC: 123 MMOL/L (ref 136–145)
SODIUM BLD-SCNC: 126 MMOL/L (ref 136–145)
SODIUM BLD-SCNC: 127 MMOL/L (ref 136–145)
TOTAL PROTEIN: 6.3 G/DL (ref 6.4–8.2)
WBC # BLD: 12 K/UL (ref 4–11)

## 2021-01-08 PROCEDURE — 2000000000 HC ICU R&B

## 2021-01-08 PROCEDURE — 77001 FLUOROGUIDE FOR VEIN DEVICE: CPT

## 2021-01-08 PROCEDURE — 36556 INSERT NON-TUNNEL CV CATH: CPT

## 2021-01-08 PROCEDURE — C1752 CATH,HEMODIALYSIS,SHORT-TERM: HCPCS

## 2021-01-08 PROCEDURE — 6360000002 HC RX W HCPCS: Performed by: PHYSICIAN ASSISTANT

## 2021-01-08 PROCEDURE — 99233 SBSQ HOSP IP/OBS HIGH 50: CPT | Performed by: INTERNAL MEDICINE

## 2021-01-08 PROCEDURE — 85610 PROTHROMBIN TIME: CPT

## 2021-01-08 PROCEDURE — 84478 ASSAY OF TRIGLYCERIDES: CPT

## 2021-01-08 PROCEDURE — 6370000000 HC RX 637 (ALT 250 FOR IP): Performed by: INTERNAL MEDICINE

## 2021-01-08 PROCEDURE — 85384 FIBRINOGEN ACTIVITY: CPT

## 2021-01-08 PROCEDURE — 94761 N-INVAS EAR/PLS OXIMETRY MLT: CPT

## 2021-01-08 PROCEDURE — 2580000003 HC RX 258: Performed by: INTERNAL MEDICINE

## 2021-01-08 PROCEDURE — 02HV33Z INSERTION OF INFUSION DEVICE INTO SUPERIOR VENA CAVA, PERCUTANEOUS APPROACH: ICD-10-PCS | Performed by: RADIOLOGY

## 2021-01-08 PROCEDURE — 6360000002 HC RX W HCPCS: Performed by: INTERNAL MEDICINE

## 2021-01-08 PROCEDURE — 2500000003 HC RX 250 WO HCPCS: Performed by: INTERNAL MEDICINE

## 2021-01-08 PROCEDURE — C9113 INJ PANTOPRAZOLE SODIUM, VIA: HCPCS | Performed by: INTERNAL MEDICINE

## 2021-01-08 PROCEDURE — 80053 COMPREHEN METABOLIC PANEL: CPT

## 2021-01-08 PROCEDURE — 84100 ASSAY OF PHOSPHORUS: CPT

## 2021-01-08 PROCEDURE — 99291 CRITICAL CARE FIRST HOUR: CPT | Performed by: INTERNAL MEDICINE

## 2021-01-08 PROCEDURE — P9045 ALBUMIN (HUMAN), 5%, 250 ML: HCPCS | Performed by: INTERNAL MEDICINE

## 2021-01-08 PROCEDURE — 85025 COMPLETE CBC W/AUTO DIFF WBC: CPT

## 2021-01-08 PROCEDURE — 36415 COLL VENOUS BLD VENIPUNCTURE: CPT

## 2021-01-08 PROCEDURE — 83036 HEMOGLOBIN GLYCOSYLATED A1C: CPT

## 2021-01-08 RX ORDER — ANTICOAGULANT CITRATE DEXTROSE SOLUTION FORMULA A 12.25; 11; 3.65 G/500ML; G/500ML; G/500ML
SOLUTION INTRAVENOUS ONCE
Status: COMPLETED | OUTPATIENT
Start: 2021-01-08 | End: 2021-01-08

## 2021-01-08 RX ORDER — NICOTINE POLACRILEX 4 MG
15 LOZENGE BUCCAL PRN
Status: DISCONTINUED | OUTPATIENT
Start: 2021-01-08 | End: 2021-01-08 | Stop reason: SDUPTHER

## 2021-01-08 RX ORDER — DEXTROSE MONOHYDRATE 50 MG/ML
100 INJECTION, SOLUTION INTRAVENOUS PRN
Status: DISCONTINUED | OUTPATIENT
Start: 2021-01-08 | End: 2021-01-08 | Stop reason: SDUPTHER

## 2021-01-08 RX ORDER — DEXTROSE MONOHYDRATE 25 G/50ML
12.5 INJECTION, SOLUTION INTRAVENOUS PRN
Status: DISCONTINUED | OUTPATIENT
Start: 2021-01-08 | End: 2021-01-11 | Stop reason: HOSPADM

## 2021-01-08 RX ORDER — HEPARIN SODIUM 1000 [USP'U]/ML
2800 INJECTION, SOLUTION INTRAVENOUS; SUBCUTANEOUS PRN
Status: DISCONTINUED | OUTPATIENT
Start: 2021-01-08 | End: 2021-01-11 | Stop reason: HOSPADM

## 2021-01-08 RX ORDER — NICOTINE POLACRILEX 4 MG
15 LOZENGE BUCCAL PRN
Status: DISCONTINUED | OUTPATIENT
Start: 2021-01-08 | End: 2021-01-11 | Stop reason: HOSPADM

## 2021-01-08 RX ORDER — DEXTROSE MONOHYDRATE 25 G/50ML
12.5 INJECTION, SOLUTION INTRAVENOUS PRN
Status: DISCONTINUED | OUTPATIENT
Start: 2021-01-08 | End: 2021-01-08 | Stop reason: SDUPTHER

## 2021-01-08 RX ORDER — FENOFIBRATE 160 MG/1
160 TABLET ORAL DAILY
Status: DISCONTINUED | OUTPATIENT
Start: 2021-01-08 | End: 2021-01-11 | Stop reason: HOSPADM

## 2021-01-08 RX ORDER — POTASSIUM CHLORIDE 20 MEQ/1
40 TABLET, EXTENDED RELEASE ORAL PRN
Status: DISCONTINUED | OUTPATIENT
Start: 2021-01-08 | End: 2021-01-11 | Stop reason: HOSPADM

## 2021-01-08 RX ORDER — DEXTROSE MONOHYDRATE 50 MG/ML
100 INJECTION, SOLUTION INTRAVENOUS PRN
Status: DISCONTINUED | OUTPATIENT
Start: 2021-01-08 | End: 2021-01-11 | Stop reason: HOSPADM

## 2021-01-08 RX ORDER — ALBUMIN, HUMAN INJ 5% 5 %
2750 SOLUTION INTRAVENOUS ONCE
Status: COMPLETED | OUTPATIENT
Start: 2021-01-08 | End: 2021-01-08

## 2021-01-08 RX ORDER — POTASSIUM CHLORIDE 7.45 MG/ML
10 INJECTION INTRAVENOUS PRN
Status: DISCONTINUED | OUTPATIENT
Start: 2021-01-08 | End: 2021-01-11 | Stop reason: HOSPADM

## 2021-01-08 RX ADMIN — ANTICOAGULANT CITRATE DEXTROSE SOLUTION FORMULA A: 12.25; 11; 3.65 SOLUTION INTRAVENOUS at 17:12

## 2021-01-08 RX ADMIN — HYDROMORPHONE HYDROCHLORIDE 1 MG: 1 INJECTION, SOLUTION INTRAMUSCULAR; INTRAVENOUS; SUBCUTANEOUS at 08:59

## 2021-01-08 RX ADMIN — ACETAMINOPHEN 650 MG: 325 TABLET ORAL at 16:57

## 2021-01-08 RX ADMIN — CALCIUM GLUCONATE 3 G: 98 INJECTION, SOLUTION INTRAVENOUS at 17:12

## 2021-01-08 RX ADMIN — SODIUM CHLORIDE, POTASSIUM CHLORIDE, SODIUM LACTATE AND CALCIUM CHLORIDE: 600; 310; 30; 20 INJECTION, SOLUTION INTRAVENOUS at 16:16

## 2021-01-08 RX ADMIN — INSULIN LISPRO 2 UNITS: 100 INJECTION, SOLUTION INTRAVENOUS; SUBCUTANEOUS at 09:03

## 2021-01-08 RX ADMIN — HEPARIN SODIUM 2800 UNITS: 1000 INJECTION INTRAVENOUS; SUBCUTANEOUS at 19:28

## 2021-01-08 RX ADMIN — INSULIN LISPRO 4 UNITS: 100 INJECTION, SOLUTION INTRAVENOUS; SUBCUTANEOUS at 23:53

## 2021-01-08 RX ADMIN — HYDROMORPHONE HYDROCHLORIDE 1 MG: 1 INJECTION, SOLUTION INTRAMUSCULAR; INTRAVENOUS; SUBCUTANEOUS at 04:32

## 2021-01-08 RX ADMIN — HYDROMORPHONE HYDROCHLORIDE 1 MG: 1 INJECTION, SOLUTION INTRAMUSCULAR; INTRAVENOUS; SUBCUTANEOUS at 17:29

## 2021-01-08 RX ADMIN — INSULIN LISPRO 4 UNITS: 100 INJECTION, SOLUTION INTRAVENOUS; SUBCUTANEOUS at 21:00

## 2021-01-08 RX ADMIN — FENOFIBRATE 160 MG: 160 TABLET ORAL at 12:54

## 2021-01-08 RX ADMIN — SODIUM CHLORIDE, POTASSIUM CHLORIDE, SODIUM LACTATE AND CALCIUM CHLORIDE: 600; 310; 30; 20 INJECTION, SOLUTION INTRAVENOUS at 08:59

## 2021-01-08 RX ADMIN — MUPIROCIN: 20 OINTMENT TOPICAL at 20:55

## 2021-01-08 RX ADMIN — ENOXAPARIN SODIUM 40 MG: 40 INJECTION SUBCUTANEOUS at 09:02

## 2021-01-08 RX ADMIN — Medication 10 ML: at 20:55

## 2021-01-08 RX ADMIN — HYDROMORPHONE HYDROCHLORIDE 1 MG: 1 INJECTION, SOLUTION INTRAMUSCULAR; INTRAVENOUS; SUBCUTANEOUS at 20:57

## 2021-01-08 RX ADMIN — MUPIROCIN: 20 OINTMENT TOPICAL at 13:17

## 2021-01-08 RX ADMIN — HYDROMORPHONE HYDROCHLORIDE 1 MG: 1 INJECTION, SOLUTION INTRAMUSCULAR; INTRAVENOUS; SUBCUTANEOUS at 13:17

## 2021-01-08 RX ADMIN — INSULIN LISPRO 2 UNITS: 100 INJECTION, SOLUTION INTRAVENOUS; SUBCUTANEOUS at 00:38

## 2021-01-08 RX ADMIN — ALBUMIN (HUMAN) 2750 ML: 12.5 INJECTION, SOLUTION INTRAVENOUS at 17:12

## 2021-01-08 RX ADMIN — Medication 10 ML: at 09:03

## 2021-01-08 RX ADMIN — SODIUM CHLORIDE, POTASSIUM CHLORIDE, SODIUM LACTATE AND CALCIUM CHLORIDE: 600; 310; 30; 20 INJECTION, SOLUTION INTRAVENOUS at 20:55

## 2021-01-08 RX ADMIN — INSULIN LISPRO 4 UNITS: 100 INJECTION, SOLUTION INTRAVENOUS; SUBCUTANEOUS at 18:07

## 2021-01-08 RX ADMIN — PANTOPRAZOLE SODIUM 40 MG: 40 INJECTION, POWDER, FOR SOLUTION INTRAVENOUS at 09:02

## 2021-01-08 RX ADMIN — INSULIN LISPRO 2 UNITS: 100 INJECTION, SOLUTION INTRAVENOUS; SUBCUTANEOUS at 04:29

## 2021-01-08 ASSESSMENT — PAIN DESCRIPTION - LOCATION: LOCATION: ABDOMEN

## 2021-01-08 ASSESSMENT — PAIN SCALES - GENERAL
PAINLEVEL_OUTOF10: 0
PAINLEVEL_OUTOF10: 8
PAINLEVEL_OUTOF10: 7
PAINLEVEL_OUTOF10: 7

## 2021-01-08 ASSESSMENT — PAIN - FUNCTIONAL ASSESSMENT: PAIN_FUNCTIONAL_ASSESSMENT: PREVENTS OR INTERFERES SOME ACTIVE ACTIVITIES AND ADLS

## 2021-01-08 NOTE — PROGRESS NOTES
Going to ICU 5 for insulin drip for triglycerides of 4425. Patient's vital signs are stable. Patient ambulatory in room. Occasionally complains of pain in abdomen radiating to supra pubic region. Managed with Dilaudid.

## 2021-01-08 NOTE — PROGRESS NOTES
[address] [address]    [address] [facility] ,  [address] [facility]  Phone: [phone]  [address] [place] [address],  [address] [place] [address], [address] [place] [address]  Phone: [phone]   Fax:[phone]    HPI: Alice Paul is a(n)39y.o. year old malewith medical history of obesity (BMI 35), diabetes mellitus type 2 diet controlled and GERD presents with epigastric abdominal pain of 1 day duration. Found to have Idiopathic acute pancreatitis [K85.00]. We are following for acute interstitial pancreatitis    Patient was seen and examined about 10:40 a.m. Reports persisting abdominal pain without much improvement. Current Hospital Schedued Meds   pantoprazole  40 mg Intravenous Daily    sodium chloride flush  10 mL Intravenous 2 times per day    enoxaparin  40 mg Subcutaneous Daily    insulin lispro  0-6 Units Subcutaneous Q4H     Current Hospital IV Meds   lactated ringers 150 mL/hr at 01/07/21 2054    dextrose       Current Hospital Prn Meds  sodium chloride flush, magnesium sulfate, acetaminophen, ondansetron, glucose, dextrose, glucagon (rDNA), dextrose, HYDROmorphone **OR** HYDROmorphone    No intake/output data recorded. BP (!) 133/94   Pulse 111   Temp 98.2 °F (36.8 °C) (Oral)   Resp 16   Ht 5' 8\" (1.727 m)   Wt 230 lb (104.3 kg)   SpO2 95%   BMI 34.97 kg/m²   BMs: 0  Wt Readings from Last 3 Encounters:   01/07/21 230 lb (104.3 kg)       Physical Exam:  VITAL SIGNS: BP (!) 133/94   Pulse 111   Temp 98.2 °F (36.8 °C) (Oral)   Resp 16   Ht 5' 8\" (1.727 m)   Wt 230 lb (104.3 kg)   SpO2 95%   BMI 34.97 kg/m²   Wt Readings from Last 3 Encounters:   01/07/21 230 lb (104.3 kg)     Constitutional: Obese male, Well developed, Well nourished, No acute distress, Non-toxic appearance. HENT: Normocephalic, Atraumatic, Bilateral external ears normal, Oropharynx moist, No oral exudates, Nose normal.   Eyes: Conjunctiva normal, No discharge.    Neck: Normal range of motion, No tenderness, Supple, No stridor. Lymphatic: No cervical, subclavian, or axillary lymphadenopathy. Cardiovascular: Normal heart rate, Normal rhythm, No murmurs, No rubs, No gallops. Thorax & Lungs: Normal breath sounds, No respiratory distress, No wheezing, No chest tenderness. Abdomen: Pannus abdomen, normal bowel sounds, soft, tenderness in lower abdomen, distended, no hernias  Rectal:  Deferred. Skin: Warm, Dry, No erythema, No rash. No bruising. No spider hemangiomas. Back: No tenderness, No CVA tenderness. Lower Extremities: Intact distal pulses, No edema, No tenderness, No cyanosis, No clubbing. Neurologic: Alert & oriented x 3, Normal motor function, Normal sensory function, No focal deficits noted. Lab Results   Component Value Date    ALT see below 01/07/2021    AST see below 01/07/2021    ALKPHOS 68 01/08/2021    PROT 6.3 (L) 01/08/2021    LABALBU 3.3 (L) 01/08/2021    LIPASE 1,299.0 (H) 01/07/2021     Lab Results   Component Value Date    WBC 12.0 (H) 01/08/2021    HGB 15.8 01/08/2021    HCT 46.4 01/08/2021    MCV 86.3 01/08/2021     01/08/2021     Lab Results   Component Value Date     01/08/2021    K 3.9 01/08/2021    CL 94 01/08/2021    CO2 17 01/08/2021    BUN 8 01/08/2021     Lab Results   Component Value Date    CREATININE <0.5 (L) 01/08/2021       CT abdomen pelvis on 1/7/2021 acute pancreatitis with peripancreatic fluid and fat stranding. Fatty infiltration of liver and diverticulosis without diverticulitis. Ultrasound right upper quadrant on 1/7/2021 noted fatty infiltration of liver, normal gallbladder, normal common bile duct to 5 mm. Lipid panel noted for elevated triglycerides > 4425 g/dL and total cholesterol 976    A/P  1. Acute interstitial pancreatitis - hypertriglyceridemia induced (very severe TG > 2000 g/dL).  Ruled out biliary pancreatitis; less likely alcoholic pancreatitis and hypercalcemia induced pancreatitis.      Recommend ICU management for plasmapheresis or insulin drip therapy. Continue IV fluids with lactated Ringer's at 250 ml/h and then 150 ml/h   N.p.o. with ice chips and sips of water   Pain control  GI to follow           Active Problems:    Acute pancreatitis    Hyperglycemia    Gastroesophageal reflux disease without esophagitis    Obesity (BMI 30.0-34. 9)  Resolved Problems:    * No resolved hospital problems. *    Patient Active Problem List   Diagnosis Code    Acute pancreatitis K85.90    Hyperglycemia R73.9    Gastroesophageal reflux disease without esophagitis K21.9    Obesity (BMI 30.0-34. 9) E66.9       Thank you for involving [facility]) Gastroenterology in the hospital care of [facility]. For further questions or concerns, we can best be reached through perfect serv.         Heena Greer 1/8/21 7:22 AM EST

## 2021-01-08 NOTE — CONSULTS
[facility]  Consult Note           Reason for Consult:  Hypertriglyceridemia  Requesting Physician:  Dr. Rudi Calle    Chief Complaint:  Abdominal pain  History Obtained From:  patient, electronic medical record    History of Present Ilness:    39year old diet-controlled DM, Hypertriglyceridemia, & GERD admitted with abdominal pain. We have been asked to assist in further mgmt of his hypertriglyceridemia and possible needs for plasmapheresis. Started having one day history PTA of upper abdominal pain. States still having right upper and mid abdominal pain  Last meal was one day ago. Found to have TG's of 4,425. States was on medication for high triglycerides in the past though could not tolerate. States drinks alcohol only occasionally. Denies any vomiting, diarrhea, fevers, sob, chest pain, or abdominal pain. Past Medical History:        Diagnosis Date    Diabetes mellitus ([facility] [address]     diet controlled       Past Surgical History:    No past surgical history on file. Home Medications:    No current facility-administered medications on file prior to encounter.       Current Outpatient Medications on File Prior to Encounter   Medication Sig Dispense Refill    esomeprazole Magnesium (NEXIUM) 20 MG PACK Take 20 mg by mouth daily         Allergies:  Penicillins    Social History:    Social History     Socioeconomic History    Marital status:      Spouse name: Not on file    Number of children: Not on file    Years of education: Not on file    Highest education level: Not on file   Occupational History    Not on file   Social Needs    Financial resource strain: Not on file    Food insecurity     Worry: Not on file     Inability: Not on file    Transportation needs     Medical: Not on file     Non-medical: Not on file   Tobacco Use    Smoking status: Never Smoker   Substance and Sexual Activity    Alcohol use: Yes     Comment: occasional    Drug use: Not Currently    Sexual 01/08/2021    CL 94 01/08/2021    CO2 17 01/08/2021    BUN 8 01/08/2021    LABALBU 3.3 01/08/2021    CREATININE <0.5 01/08/2021    CALCIUM 8.5 01/08/2021    GFRAA >60 01/08/2021    LABGLOM >60 01/08/2021    GLUCOSE 235 01/08/2021         Assessment/    - Hypertriglyceridemia c/b acute pancreatitis    - Pseudohyponatremia - correction for high triglycerides results in SNa ~135    - DM - diet controlled    - Fatty liver    - Hypertension - trending higher with pain      Plan/    - May benefit from trial of plasmapheresis - plan for treatment today and tomorrow, patient agreeable  - Radiology consult for vascath placement  - Trend TG levels, bp's, & overall symptoms with above treatment  - Likely to need chronic medical treatment as outpatient for DM & TG levels    Case d/w Dr. Ramona Caballero & Dr. Ramandeep Ayala you for the consultation. Please do not hesitate to call with questions.     [employer]

## 2021-01-08 NOTE — PROGRESS NOTES
Progress Note    Admit Date:  1/7/2021    Patient admitted with acute pancreatitis. Etiology is hypertriglyceridemia. Triglyceride levels came back greater than 4425. Subjective:  Mr. Arlene Bingham seen . He has persistent abdominal pain pain increases with any activity. . No nausea vomiting no fevers    Objective:   Patient Vitals for the past 4 hrs:   BP Temp Temp src Pulse Resp SpO2   01/08/21 0720 (!) 133/94 98.2 °F (36.8 °C) Oral 111 16 95 %        No intake or output data in the 24 hours ending 01/08/21 1047    Physical Exam:  Gen: No distress. Alert. Middle aged  male, obese, appears uncomfortable with frequent positional changes  Eyes: No sclera icterus. No conjunctival injection. Glasses   Neck: Trachea midline. Resp: No accessory muscle use. No crackles. No wheezes. No rhonchi. On RA  CV: Regular rate. Regular rhythm. No murmur. No rub. No edema. Peripheral Pulses: +2 palpable, equal bilaterally   GI: Soft, obese, diffusely tender - greatest in epigastric region. Non-distended. Normal bowel sounds. Skin: Warm and dry. No rash on exposed extremities. Neuro: Awake. Grossly nonfocal, no aphasia, follows commands, moves extremities symmetrically. Psych: Oriented x 3. No anxiety or agitation.      Scheduled Meds:   pantoprazole  40 mg Intravenous Daily    sodium chloride flush  10 mL Intravenous 2 times per day    enoxaparin  40 mg Subcutaneous Daily    insulin lispro  0-6 Units Subcutaneous Q4H       Continuous Infusions:   lactated ringers 150 mL/hr at 01/08/21 0859    dextrose         PRN Meds:  sodium chloride flush, magnesium sulfate, acetaminophen, ondansetron, glucose, dextrose, glucagon (rDNA), dextrose, HYDROmorphone **OR** HYDROmorphone      Data:  CBC:   Recent Labs     01/07/21  0306 01/08/21  0518   WBC 14.3* 12.0*   HGB 16.5 15.8   HCT 48.4 46.4   MCV 83.8 86.3    212     BMP:   Recent Labs     01/07/21  0306 01/08/21 0518   * 126*   K 4.1 3.9   CL 95* 94* CO2 17* 17*   PHOS 3.5  --    BUN 10 8   CREATININE 0.6* <0.5*     LIVER PROFILE:   Recent Labs     01/07/21  0306 01/08/21  0518   AST see below see below   ALT see below see below   LIPASE 1,299.0*  --    BILITOT 0.5 0.6   ALKPHOS 98 68     CULTURES  N/A     RADIOLOGY    US GALLBLADDER RUQ 1/7/2021   Final Result   Fatty infiltration of the liver. CT ABDOMEN PELVIS W IV CONTRAST Additional Contrast? None 1/7/2021   Final Result   1. Acute pancreatitis. 2. Diverticulosis without scan evidence for diverticulitis. Assessment/Plan:    Acute Pancreatitis 2/2  Hypertriglyceridemia  - denies alcohol abuse  - Admitted to Med Surg   - RUQ U/S - showed fatty liver, trigs - >4,425  - medications reviewed  - NPO, IVF, PRN Dilaudid, PRN Zofran  - GI consulted  - transfer to ICU for insulin gtt, start Fenofibrate and trend Trig q6hrs. Continue insulin drip until triglyceride levels are less than 500. Start IV fluids with D5 for any hypoglycemia with insulin gtt.     Hyponatremia  - in setting of hyperglycemia and hypertriglyceridemia  - corrects to 134  - mgmt of sugars as below, IVF  - repeat BMP today 126     Leukocytosis  - w/ no obvious source of infection  - favor reactive  - repeat CBC  - improved to 12     New Onset Type II DM - not in DKA  - check Hgb A1c - pending  - on low dose SSI > change to insulin gtt 2/2 above  - diet: currently NPO     GERD  - cont PPI     Obesity  - Body mass index is 34.97 kg/m².   - Counseled on weight loss.      RAPID COVID TEST WAS NEGATIVE    DVT Prophylaxis: Lovenox  Diet: Diet NPO Effective Now Exceptions are: Ice Chips  Code Status: Full Code     Transfer to ICU for insulin gtt      Shaan Acharya MD   1/8/2021

## 2021-01-08 NOTE — PROGRESS NOTES
Spoke with Dr. Selina Pineda and Dr. Hayde Richardson. Orders for plasma phoresis obtained. Orders from obtianed to D/C insulin drip, and dextrose. Increase LR with to 150 mL/hour.  Coy Solis

## 2021-01-08 NOTE — CONSULTS
Patient is being seen at the request of Dr. Abimbola Gorman  for a consultation for hypertriglyceridemia    HISTORY OF PRESENT ILLNESS: This is a 51-year-old male with a history of diabetes who presented on 1/7/2021 with a 1 day history of severe epigastric abdominal pain, associated with nausea. No exacerbating or alleviating factors. No similar experiences in the past.  He was admitted to the internal medicine service and this morning was found to have a triglyceride level greater than 4,425. He was transferred to the ICU and we were consulted to manage hypertriglyceridemia with insulin infusion. PAST MEDICAL HISTORY:  Past Medical History:   Diagnosis Date    Diabetes mellitus ([address]     diet controlled     PAST SURGICAL HISTORY:  No past surgical history on file. FAMILY HISTORY:  No known early lung disease    SOCIAL HISTORY:   reports that he has never smoked. He does not have any smokeless tobacco history on file. Scheduled Meds:   fenofibrate  160 mg Oral Daily    pantoprazole  40 mg Intravenous Daily    sodium chloride flush  10 mL Intravenous 2 times per day    enoxaparin  40 mg Subcutaneous Daily     Continuous Infusions:   insulin      insulin      dextrose      lactated ringers 150 mL/hr at 01/08/21 0859    dextrose       PRN Meds:  potassium chloride **OR** potassium alternative oral replacement **OR** potassium chloride, sodium phosphate IVPB **OR** sodium phosphate IVPB, glucose, dextrose, glucagon (rDNA), dextrose, sodium chloride flush, magnesium sulfate, acetaminophen, ondansetron, glucose, dextrose, glucagon (rDNA), dextrose, HYDROmorphone **OR** HYDROmorphone    ALLERGIES:  Patient is allergic to penicillins.     REVIEW OF SYSTEMS:  Constitutional: Negative for fever  HENT: Negative for sore throat  Eyes: Negative for redness   Respiratory: Negative for dyspnea, cough  Cardiovascular: Negative for chest pain  Gastrointestinal: Abdominal pain  Genitourinary: Negative for hematuria Musculoskeletal: Negative for arthralgias   Skin: Negative for rash  Neurological: Negative for syncope  Hematological: Negative for adenopathy  Psychiatric/Behavorial: Negative for anxiety    PHYSICAL EXAM:  Blood pressure (!) 145/93, pulse 108, temperature 98.2 °F (36.8 °C), temperature source Oral, resp. rate 16, height 5' 8\" (1.727 m), weight 230 lb (104.3 kg), SpO2 95 %.' on room air  General: ill appearing. Eyes: PERRL. No sclera icterus. No conjunctival injection. ENT: No discharge. Pharynx clear. Neck: Trachea midline. Normal thyroid. Resp: No accessory muscle use. No crackles. No wheezing. No rhonchi. No dullness on percussion. CV: Regular rate. Regular rhythm. No mumur or rub. No edema. Peripheral pulses are 2+. Capillary refill is less than 3 seconds. GI:  tender. Non-distended. No masses. No organomegaly. Normal bowel sounds. No hernia. Skin: Warm and dry. No nodule on exposed extremities. No rash on exposed extremities. Lymph: No cervical LAD. No supraclavicular LAD. M/S: No cyanosis. No joint deformity. No clubbing. Neuro: Alert and oriented x3. Patellar reflexes are symmetric. Psych: No agitation, no anxiety, affect is full. LABS:  CBC:   Recent Labs     01/07/21 0306 01/08/21 0518   WBC 14.3* 12.0*   HGB 16.5 15.8   HCT 48.4 46.4   MCV 83.8 86.3    212     BMP:   Recent Labs     01/07/21 0306 01/08/21 0518   * 126*   K 4.1 3.9   CL 95* 94*   CO2 17* 17*   PHOS 3.5  --    BUN 10 8   CREATININE 0.6* <0.5*     LIVER PROFILE:   Recent Labs     01/07/21 0306 01/08/21 0518   AST see below see below   ALT see below see below   LIPASE 1,299.0*  --    BILITOT 0.5 0.6   ALKPHOS 98 68     PT/INR: No results for input(s): PROTIME, INR in the last 72 hours. APTT: No results for input(s): APTT in the last 72 hours.   UA:No results for input(s): NITRITE, COLORU, PHUR, LABCAST, WBCUA, RBCUA, MUCUS, TRICHOMONAS, YEAST, BACTERIA, CLARITYU, SPECGRAV, LEUKOCYTESUR, UROBILINOGEN, BILIRUBINUR, BLOODU, GLUCOSEU, AMORPHOUS in the last 72 hours. Invalid input(s): KETONESU  No results for input(s): PHART, ZRL0JXN, PO2ART in the last 72 hours. Cultures:      1/7/2020 SARS-CoV-2 negative    Chest imaging was reviewed by me and showed:   Abdominal CT 1/7/2020 lung windows are clear  Impression   1. Acute pancreatitis. 2. Diverticulosis without scan evidence for diverticulitis. Fatty liver      ASSESSMENT:  · Acute pancreatitis   · Hypertriglyceridemia  · Mild hyponatremia -this is artifactual, using correction formula for hypertriglyceridemia, serum sodium is 135  · Diabetes mellitus  · Fatty liver    PLAN:  · IV fluids  · Insulin infusion 0.1 Units/kg/hour, Dextrose infusion with Q 1 H FSBS monitoring, see orders  · Place HD catheter for possible plasmapharesis. I have d/w nephrology. D/C insulin if plasmapheresis started  · Prophylaxis: Lovenox, protonix, bactroban     Total critical care time caring for this patient with life threatening, unstable organ failure, including direct patient contact, management of life support systems, review of data including imaging and labs, discussions with other team members and physicians is 31 minutes so far today, excluding procedures.

## 2021-01-09 LAB
ALBUMIN SERPL-MCNC: 3.5 G/DL (ref 3.4–5)
ALP BLD-CCNC: 40 U/L (ref 40–129)
ALT SERPL-CCNC: 11 U/L (ref 10–40)
ANION GAP SERPL CALCULATED.3IONS-SCNC: 10 MMOL/L (ref 3–16)
ANION GAP SERPL CALCULATED.3IONS-SCNC: 11 MMOL/L (ref 3–16)
ANION GAP SERPL CALCULATED.3IONS-SCNC: 11 MMOL/L (ref 3–16)
AST SERPL-CCNC: 9 U/L (ref 15–37)
BILIRUB SERPL-MCNC: 0.9 MG/DL (ref 0–1)
BILIRUBIN DIRECT: 0.3 MG/DL (ref 0–0.3)
BILIRUBIN, INDIRECT: 0.6 MG/DL (ref 0–1)
BUN BLDV-MCNC: 10 MG/DL (ref 7–20)
BUN BLDV-MCNC: 8 MG/DL (ref 7–20)
BUN BLDV-MCNC: 9 MG/DL (ref 7–20)
CALCIUM SERPL-MCNC: 8.1 MG/DL (ref 8.3–10.6)
CALCIUM SERPL-MCNC: 8.1 MG/DL (ref 8.3–10.6)
CALCIUM SERPL-MCNC: 8.4 MG/DL (ref 8.3–10.6)
CHLORIDE BLD-SCNC: 101 MMOL/L (ref 99–110)
CHLORIDE BLD-SCNC: 102 MMOL/L (ref 99–110)
CHLORIDE BLD-SCNC: 94 MMOL/L (ref 99–110)
CO2: 22 MMOL/L (ref 21–32)
CO2: 22 MMOL/L (ref 21–32)
CO2: 23 MMOL/L (ref 21–32)
CREAT SERPL-MCNC: <0.5 MG/DL (ref 0.9–1.3)
ESTIMATED AVERAGE GLUCOSE: 211.6 MG/DL
GFR AFRICAN AMERICAN: >60
GFR NON-AFRICAN AMERICAN: >60
GLUCOSE BLD-MCNC: 182 MG/DL (ref 70–99)
GLUCOSE BLD-MCNC: 202 MG/DL (ref 70–99)
GLUCOSE BLD-MCNC: 206 MG/DL (ref 70–99)
GLUCOSE BLD-MCNC: 209 MG/DL (ref 70–99)
GLUCOSE BLD-MCNC: 226 MG/DL (ref 70–99)
GLUCOSE BLD-MCNC: 230 MG/DL (ref 70–99)
GLUCOSE BLD-MCNC: 239 MG/DL (ref 70–99)
HBA1C MFR BLD: 9 %
LIPASE: 77 U/L (ref 13–60)
MAGNESIUM: 1.7 MG/DL (ref 1.8–2.4)
PERFORMED ON: ABNORMAL
PHOSPHORUS: 1.3 MG/DL (ref 2.5–4.9)
PHOSPHORUS: 1.3 MG/DL (ref 2.5–4.9)
PHOSPHORUS: 2.1 MG/DL (ref 2.5–4.9)
POTASSIUM SERPL-SCNC: 3.3 MMOL/L (ref 3.5–5.1)
POTASSIUM SERPL-SCNC: 3.6 MMOL/L (ref 3.5–5.1)
POTASSIUM SERPL-SCNC: 3.8 MMOL/L (ref 3.5–5.1)
SODIUM BLD-SCNC: 126 MMOL/L (ref 136–145)
SODIUM BLD-SCNC: 135 MMOL/L (ref 136–145)
SODIUM BLD-SCNC: 135 MMOL/L (ref 136–145)
TOTAL PROTEIN: 5.5 G/DL (ref 6.4–8.2)
TRIGL SERPL-MCNC: 1149 MG/DL (ref 0–150)
TRIGL SERPL-MCNC: 405 MG/DL (ref 0–150)
TRIGL SERPL-MCNC: 423 MG/DL (ref 0–150)
TRIGL SERPL-MCNC: 457 MG/DL (ref 0–150)

## 2021-01-09 PROCEDURE — C9113 INJ PANTOPRAZOLE SODIUM, VIA: HCPCS | Performed by: INTERNAL MEDICINE

## 2021-01-09 PROCEDURE — 2580000003 HC RX 258: Performed by: INTERNAL MEDICINE

## 2021-01-09 PROCEDURE — 6360000002 HC RX W HCPCS: Performed by: INTERNAL MEDICINE

## 2021-01-09 PROCEDURE — 2060000000 HC ICU INTERMEDIATE R&B

## 2021-01-09 PROCEDURE — 83735 ASSAY OF MAGNESIUM: CPT

## 2021-01-09 PROCEDURE — 99232 SBSQ HOSP IP/OBS MODERATE 35: CPT | Performed by: INTERNAL MEDICINE

## 2021-01-09 PROCEDURE — 2500000003 HC RX 250 WO HCPCS: Performed by: INTERNAL MEDICINE

## 2021-01-09 PROCEDURE — P9045 ALBUMIN (HUMAN), 5%, 250 ML: HCPCS | Performed by: INTERNAL MEDICINE

## 2021-01-09 PROCEDURE — 80048 BASIC METABOLIC PNL TOTAL CA: CPT

## 2021-01-09 PROCEDURE — 80076 HEPATIC FUNCTION PANEL: CPT

## 2021-01-09 PROCEDURE — 36415 COLL VENOUS BLD VENIPUNCTURE: CPT

## 2021-01-09 PROCEDURE — 84100 ASSAY OF PHOSPHORUS: CPT

## 2021-01-09 PROCEDURE — 84478 ASSAY OF TRIGLYCERIDES: CPT

## 2021-01-09 PROCEDURE — 6370000000 HC RX 637 (ALT 250 FOR IP): Performed by: INTERNAL MEDICINE

## 2021-01-09 PROCEDURE — 2580000003 HC RX 258

## 2021-01-09 PROCEDURE — 83690 ASSAY OF LIPASE: CPT

## 2021-01-09 RX ORDER — ANTICOAGULANT CITRATE DEXTROSE SOLUTION FORMULA A 12.25; 11; 3.65 G/500ML; G/500ML; G/500ML
SOLUTION INTRAVENOUS ONCE
Status: COMPLETED | OUTPATIENT
Start: 2021-01-09 | End: 2021-01-09

## 2021-01-09 RX ORDER — MAGNESIUM SULFATE IN WATER 40 MG/ML
2 INJECTION, SOLUTION INTRAVENOUS ONCE
Status: COMPLETED | OUTPATIENT
Start: 2021-01-09 | End: 2021-01-09

## 2021-01-09 RX ORDER — ALBUMIN, HUMAN INJ 5% 5 %
2750 SOLUTION INTRAVENOUS ONCE
Status: COMPLETED | OUTPATIENT
Start: 2021-01-09 | End: 2021-01-09

## 2021-01-09 RX ORDER — SODIUM CHLORIDE 9 MG/ML
INJECTION, SOLUTION INTRAVENOUS
Status: COMPLETED
Start: 2021-01-09 | End: 2021-01-09

## 2021-01-09 RX ADMIN — HYDROMORPHONE HYDROCHLORIDE 1 MG: 1 INJECTION, SOLUTION INTRAMUSCULAR; INTRAVENOUS; SUBCUTANEOUS at 01:08

## 2021-01-09 RX ADMIN — ACETAMINOPHEN 650 MG: 325 TABLET ORAL at 13:17

## 2021-01-09 RX ADMIN — MAGNESIUM SULFATE IN WATER 2 G: 40 INJECTION, SOLUTION INTRAVENOUS at 12:32

## 2021-01-09 RX ADMIN — HYDROMORPHONE HYDROCHLORIDE 1 MG: 1 INJECTION, SOLUTION INTRAMUSCULAR; INTRAVENOUS; SUBCUTANEOUS at 22:29

## 2021-01-09 RX ADMIN — Medication 10 ML: at 20:16

## 2021-01-09 RX ADMIN — SODIUM PHOSPHATE, MONOBASIC, MONOHYDRATE 33.39 MMOL: 276; 142 INJECTION, SOLUTION INTRAVENOUS at 08:33

## 2021-01-09 RX ADMIN — INSULIN LISPRO 4 UNITS: 100 INJECTION, SOLUTION INTRAVENOUS; SUBCUTANEOUS at 12:03

## 2021-01-09 RX ADMIN — FENOFIBRATE 160 MG: 160 TABLET ORAL at 08:34

## 2021-01-09 RX ADMIN — MUPIROCIN: 20 OINTMENT TOPICAL at 20:15

## 2021-01-09 RX ADMIN — INSULIN LISPRO 2 UNITS: 100 INJECTION, SOLUTION INTRAVENOUS; SUBCUTANEOUS at 20:16

## 2021-01-09 RX ADMIN — CALCIUM GLUCONATE 3 G: 98 INJECTION, SOLUTION INTRAVENOUS at 13:09

## 2021-01-09 RX ADMIN — MUPIROCIN: 20 OINTMENT TOPICAL at 08:35

## 2021-01-09 RX ADMIN — POTASSIUM PHOSPHATE, MONOBASIC AND POTASSIUM PHOSPHATE, DIBASIC 30 MMOL: 224; 236 INJECTION, SOLUTION, CONCENTRATE INTRAVENOUS at 12:19

## 2021-01-09 RX ADMIN — INSULIN LISPRO 4 UNITS: 100 INJECTION, SOLUTION INTRAVENOUS; SUBCUTANEOUS at 08:35

## 2021-01-09 RX ADMIN — HYDROMORPHONE HYDROCHLORIDE 1 MG: 1 INJECTION, SOLUTION INTRAMUSCULAR; INTRAVENOUS; SUBCUTANEOUS at 12:51

## 2021-01-09 RX ADMIN — HEPARIN SODIUM 2800 UNITS: 1000 INJECTION INTRAVENOUS; SUBCUTANEOUS at 14:50

## 2021-01-09 RX ADMIN — SODIUM CHLORIDE, POTASSIUM CHLORIDE, SODIUM LACTATE AND CALCIUM CHLORIDE: 600; 310; 30; 20 INJECTION, SOLUTION INTRAVENOUS at 20:14

## 2021-01-09 RX ADMIN — HYDROMORPHONE HYDROCHLORIDE 1 MG: 1 INJECTION, SOLUTION INTRAMUSCULAR; INTRAVENOUS; SUBCUTANEOUS at 18:22

## 2021-01-09 RX ADMIN — INSULIN LISPRO 4 UNITS: 100 INJECTION, SOLUTION INTRAVENOUS; SUBCUTANEOUS at 16:46

## 2021-01-09 RX ADMIN — PANTOPRAZOLE SODIUM 40 MG: 40 INJECTION, POWDER, FOR SOLUTION INTRAVENOUS at 08:33

## 2021-01-09 RX ADMIN — HYDROMORPHONE HYDROCHLORIDE 1 MG: 1 INJECTION, SOLUTION INTRAMUSCULAR; INTRAVENOUS; SUBCUTANEOUS at 07:25

## 2021-01-09 RX ADMIN — ANTICOAGULANT CITRATE DEXTROSE SOLUTION FORMULA A: 12.25; 11; 3.65 SOLUTION INTRAVENOUS at 13:09

## 2021-01-09 RX ADMIN — ALBUMIN (HUMAN) 2750 ML: 12.5 INJECTION, SOLUTION INTRAVENOUS at 13:09

## 2021-01-09 RX ADMIN — ENOXAPARIN SODIUM 40 MG: 40 INJECTION SUBCUTANEOUS at 08:34

## 2021-01-09 RX ADMIN — SODIUM CHLORIDE: 9 INJECTION, SOLUTION INTRAVENOUS at 12:00

## 2021-01-09 RX ADMIN — SODIUM CHLORIDE, POTASSIUM CHLORIDE, SODIUM LACTATE AND CALCIUM CHLORIDE: 600; 310; 30; 20 INJECTION, SOLUTION INTRAVENOUS at 04:10

## 2021-01-09 RX ADMIN — ACETAMINOPHEN 650 MG: 325 TABLET ORAL at 05:03

## 2021-01-09 RX ADMIN — INSULIN LISPRO 4 UNITS: 100 INJECTION, SOLUTION INTRAVENOUS; SUBCUTANEOUS at 04:12

## 2021-01-09 RX ADMIN — Medication 10 ML: at 08:33

## 2021-01-09 ASSESSMENT — PAIN SCALES - GENERAL
PAINLEVEL_OUTOF10: 7
PAINLEVEL_OUTOF10: 8
PAINLEVEL_OUTOF10: 8
PAINLEVEL_OUTOF10: 2
PAINLEVEL_OUTOF10: 7

## 2021-01-09 ASSESSMENT — PAIN DESCRIPTION - ONSET: ONSET: ON-GOING

## 2021-01-09 ASSESSMENT — PAIN DESCRIPTION - LOCATION: LOCATION: ABDOMEN

## 2021-01-09 ASSESSMENT — PAIN DESCRIPTION - PAIN TYPE: TYPE: ACUTE PAIN

## 2021-01-09 ASSESSMENT — PAIN DESCRIPTION - DESCRIPTORS: DESCRIPTORS: ACHING;DISCOMFORT

## 2021-01-09 NOTE — PROGRESS NOTES
Assessment complete. Patient alert and oriented x3. Complained of 6/10 RLQ pain at 0627. Dilaudid given. Patient states pain is much improved, 1/10. VSS. ST on monitor - . R AC and R FA infusing (see MAR). R IJ flushed, good blood return noted. Lung sounds clear, on room air. Abdomen taut. Tenderness noted RLQ. No edema present. NPO/ice chips at present. Patient denies needs at present. Call light within easy reach. Will continue to monitor.  Leo Corea

## 2021-01-09 NOTE — PROGRESS NOTES
Updated pt's spouse and daughter. Patient now on clear liquid diet. VSS. PRN pain medication given with relief. Call light in reach. No other needs at this time.

## 2021-01-09 NOTE — PLAN OF CARE
Problem: Infection:  Goal: Will remain free from infection  Description: Will remain free from infection  1/8/2021 2307 by Radha Falcon RN  Outcome: Ongoing     Problem: Safety:  Goal: Free from accidental physical injury  Description: Free from accidental physical injury  1/8/2021 2307 by Radha Falcon RN  Outcome: Ongoing     Problem: Daily Care:  Goal: Daily care needs are met  Description: Daily care needs are met  1/8/2021 2307 by Radha Falcon RN  Outcome: Ongoing     Problem: Pain:  Goal: Patient's pain/discomfort is manageable  Description: Patient's pain/discomfort is manageable  1/8/2021 2307 by Radha Falcon RN  Outcome: Ongoing

## 2021-01-09 NOTE — PROGRESS NOTES
Assessment complete. Blood pressure (!) 154/81, pulse 108, temperature 99.3 °F (37.4 °C), temperature source Axillary, resp. rate 17, height 5' 8\" (1.727 m), weight 230 lb (104.3 kg), SpO2 93 %. Patient plasmapheresis treatment complete. Patient states he feels okay just has a constant achy pain in his right lower side. PRN pain medication given. Patient alert oriented x 4. Patient talking on the phone with his family. Lung sounds clear. Abdomen rounded and taut. Bowel sounds positive x 4 quads. No edema noted.      Electronically signed by Rocio Newell RN on 1/8/2021 at 10:22 PM

## 2021-01-09 NOTE — FLOWSHEET NOTE
TPE ( Therapeutic Plasma Exchange)  1  Volume of TPV exchange, with 100 % replacement. Use 5% Albumin: ( total : 2750 ml)   Number of Treatment : 02 of 02 total     Pt tolerated Well with TPE,   Fluid balance: + 147 ml ( including ACD-A, Ca gluconate, saline rinse blood back to pt)    Initial setting:  AC 5.9;  inlet 58.6;  plasma removal 24.0 , Replacement 17.4, ratio 10 ,      Final values:   ml;  inlet 7627 ml ;   plasma removal 3629 ml , Replacement  2759 ml;  duration 98 minutes  Started time: 1302  End time: 14:44    Medication:  5% Albumin with TPE, started @ 1302  ACD-A ( per protocol via machine) to pt: 145 ml total during whole TPE  Ca: gluconate : 3 gm IVPB with whole TPE, started @ 1302  Tylenol: 650 mg oral @ 3999    Next TPE scheduled on unknown, evaluated by MD    Placed TPE flow sheets in the chart for EMR scan     01/09/21 1300 01/09/21 1447   Vital Signs   BP (!) 140/78 135/78   Temp 100.7 °F (38.2 °C) 99.8 °F (37.7 °C)   Pulse 110 104   Resp 20 16   SpO2 96 % 96 %   Run Parameters   Blood flow rate (ml/min) 60 ml/min  --    AC flow/volume 5.9  --    Inlet flow/volume 58.6  --    Plasma flow/volume 24.0  --    Replace flow/volume 17.4  --    ACD-A ratio/rpm 10  --    Comments TPE initiated via right IJ without problems at 13:02  --    Post-Treatment Checklist   Post-Treatment Checklist  --  Rinseback Completed;Post-tx CVC care/protocol;Equipment externally cleaned/disinfected;Biohazard wastes disposed per hospital protocol; Side rails up/call light within reach   Final Values   Apheresis Intake(ml)  --  2779 ml   Apheresis Output(ml)  --  3632 ml   Net Balance  --  147   AC-AC Bag  --  145   Eff. Ratio  --  1.2

## 2021-01-09 NOTE — PROGRESS NOTES
Pulmonary & Critical Care Medicine ICU Progress Note    CC: Acute pancreatitis, hypertriglyceridemia    Events of Last 24 hours: Transferred to ICU for insulin infusion, decision for therapeutic plasma exchange which occurred yesterday evening    Invasive Lines: IV: 2021 right IJ HD catheter    IV:   dextrose      lactated ringers 150 mL/hr at 21 0410       Vitals:  Blood pressure 135/83, pulse 114, temperature 100.3 °F (37.9 °C), temperature source Axillary, resp. rate 24, height 5' 8\" (1.727 m), weight 262 lb (118.8 kg), SpO2 92 %. on room air  Temp  Av.6 °F (37.6 °C)  Min: 98 °F (36.7 °C)  Max: 101.3 °F (38.5 °C)    Intake/Output Summary (Last 24 hours) at 2021 0612  Last data filed at 2021 2045  Gross per 24 hour   Intake 3857 ml   Output 4481 ml   Net -624 ml     EXAM:  General: Looks better today  Eyes: PERRL. No sclera icterus. No conjunctival injection. ENT: No discharge. Pharynx clear. Neck: Trachea midline. Normal thyroid. Resp: No accessory muscle use. No crackles. No wheezing. No rhonchi. CV: Regular rate. Regular rhythm. No mumur or rub. Trace edema  GI: Less tender. Non-distended. No masses. No organomegaly. Normal bowel sounds. No hernia. Skin: Warm and dry. Neuro: Alert and oriented x3.  Patellar reflexes are symmetric  Psych: No agitation, no anxiety, affect is full     Scheduled Meds:   fenofibrate  160 mg Oral Daily    mupirocin   Nasal BID    insulin lispro  0-12 Units Subcutaneous Q4H    pantoprazole  40 mg Intravenous Daily    sodium chloride flush  10 mL Intravenous 2 times per day    enoxaparin  40 mg Subcutaneous Daily     PRN Meds:  potassium chloride **OR** potassium alternative oral replacement **OR** potassium chloride, sodium phosphate IVPB **OR** sodium phosphate IVPB, glucose, dextrose, glucagon (rDNA), dextrose, heparin (porcine), sodium chloride flush, magnesium sulfate, acetaminophen, ondansetron, HYDROmorphone **OR** HYDROmorphone    Results:  CBC:   Recent Labs     01/07/21  0306 01/08/21  0518   WBC 14.3* 12.0*   HGB 16.5 15.8   HCT 48.4 46.4   MCV 83.8 86.3    212     BMP:   Recent Labs     01/08/21  1650 01/08/21  2300 01/09/21  0505   * 127* 126*   K 3.6 3.7 3.3*   CL 92* 96* 94*   CO2 20* 22 22   PHOS 1.5* 1.6* 1.3*   BUN 9 9 10   CREATININE <0.5* <0.5* <0.5*     LIVER PROFILE:   Recent Labs     01/07/21  0306 01/08/21  0518 01/09/21  0505   AST see below see below 9*   ALT see below see below 11   LIPASE 1,299.0*  --   --    BILIDIR  --   --  0.3   BILITOT 0.5 0.6 0.9   ALKPHOS 98 68 40     Cultures:      1/7/2020 SARS-CoV-2 negative    Imaging  Abdominal CT 1/7/2020 lung windows are clear  Impression   1. Acute pancreatitis. 2. Diverticulosis without scan evidence for diverticulitis. Fatty liver      ASSESSMENT:  · Acute pancreatitis   · Hypertriglyceridemia  · Mild hyponatremia -this is artifactual, using correction formula for hypertriglyceridemia, serum sodium yesterday was 135  · Diabetes mellitus  · Fatty liver    PLAN:  · IV fluids - LR  · TPE per nephrology   · Follow serum triglycerides  · KPhos given today  · Prophylaxis: Lovenox, protonix, bactroban  · I d/w Dr. Nikki Canales. TPE can be performed in the dialysis area and so patient can be safely transferred to the progressive care unit. I discussed this patient's care with Dr. Mira Massey as well. Please call me with any additional questions.

## 2021-01-09 NOTE — Clinical Note
TPE ( Therapeutic Plasma Exchange)  1  Volume of TPV exchange, with 100 % replacement.  Use 5% Albumin: ( total : 2750 ml)   Number of Treatment : 02 of 02 total     Pt tolerated Well with TPE,   Fluid balance: + *** ml ( including ACD-A, Ca gluconate, saline rinse blood back to pt)    Initial setting:  AC 5.9;  inlet 58.6;  plasma removal 24.0 , Replacement 17.4, ratio 10 ,      Final values:  AC *** ml;  inlet *** ml ;   plasma removal *** ml , Replacement  *** ml;  duration *** minutes  Started time: 1302  End time: ***    Medication:  5% Albumin with TPE, started @ 1302  ACD-A ( per protocol via machine) to pt: *** ml total during whole TPE  Ca: gluconate : *** gm IVPB with whole TPE, started @ ***  Tylenol: 650 mg oral @ 1322  Benadryl: *** mg IV @ ***  Zyrtec *** mg oral @ ***    Next TPE scheduled on unknown, evaluated by MD    Placed TPE flow sheets in the chart for EMR scan

## 2021-01-09 NOTE — PROGRESS NOTES
Reassessment complete. Patient complaining of pain in lower right abdomen. PRN medication given per request.   Offered patient a bath this shift but patient refused. States he doesn't feel like moving or being bothered. Will continue to monitor patient.   Electronically signed by Gama Murphy RN on 1/9/2021 at 1:19 AM

## 2021-01-09 NOTE — FLOWSHEET NOTE
TPE ( Therapeutic Plasma Exchange)  1  Volume of TPV exchange, with 100 % replacement. Use 5% Albumin:  2750 ml    Number of Treatment : 01 of 02 total     Pt tolerated good with TPE,   Fluid balance: + 151 ml ( including ACD-A, Ca gluconate, saline rinse blood back to pt)    Initial setting:  AC 5;  inlet 50;  plasma removal 25.3 , Replacement 19.2, ratio 10 ,      Final values:   ml;  inlet 8358 ml ;   plasma removal 3702 ml , Replacement  2760 ml;  duration 139 minutes  Started time: 1718  End time: 1954    Medication:  5% Albumin with TPE, started @ 1718  ACD-A ( per protocol via machine) to pt: 206 ml total during whole TPE  Ca: gluconate : 3 gm IVPB with whole TPE, started @ 3297    Next TPE scheduled on tomorrow    Placed TPE flow sheets in the chart for EMR scan     01/08/21 1715 01/08/21 1945   Vital Signs   BP (!) 155/97 (!) 147/90   Temp 101.3 °F (38.5 °C) 98 °F (36.7 °C)   Pulse 113 105   Resp 23 26   SpO2 95 % 94 %   Run Parameters   Blood flow rate (ml/min) 50 ml/min  --    AC flow/volume 5  --    Inlet flow/volume 50  --    Plasma flow/volume 25.3  --    Replace flow/volume 19.2  --    ACD-A ratio/rpm 10  --    Comments TPE initated via right IJ without problems @ 1718  --    Post-Treatment Checklist   Post-Treatment Checklist  --  Rinseback Completed;Post-tx CVC care/protocol;Equipment externally cleaned/disinfected;Biohazard wastes disposed per hospital protocol; Side rails up/call light within reach   Final Values   Apheresis Intake(ml)  --  3857 ml   Apheresis Output(ml)  --  3706 ml   Net Balance  --  151   AC-AC Bag  --  206   Eff. Ratio  --  1.2

## 2021-01-09 NOTE — PROGRESS NOTES
Patient to be transferred to PCU, Report given to Jayden Menon, [facility]. Transport notified. Michelle lang. New bag of lactated ringers hung.

## 2021-01-09 NOTE — PROGRESS NOTES
4 Eyes Skin Assessment     The patient is being assess for   Transfer to New Unit    I agree that 2 RN's have performed a thorough Head to Toe Skin Assessment on the patient. ALL assessment sites listed below have been assessed. Areas assessed by both nurses:   [x]   Head, Face, and Ears   [x]   Shoulders, Back, and Chest, Abdomen  [x]   Arms, Elbows, and Hands   [x]   Coccyx, Sacrum, and Ischium  [x]   Legs, Feet, and Heels        No skin issues noted. **SHARE this note so that the co-signing nurse is able to place an eSignature**    Co-signer eSignature: Electronically signed by Sandra Whelan RN on 1/9/21 at 11:54 AM EST    Does the Patient have Skin Breakdown?   No           Anthony Prevention initiated:  No   Wound Care Orders initiated:  No      [facility] nurse consulted for Pressure Injury (Stage 3,4, Unstageable, DTI, NWPT, Complex wounds)and New or Established Ostomies:  No      Primary Nurse eSignature: Electronically signed by Harshal Berkowitz RN on 1/9/21 at 11:04 AM EST

## 2021-01-09 NOTE — PROGRESS NOTES
[place] [address] [address] ,  [address] [facility]  Phone: [phone] [facility],  [address], [address] [facility]  Phone: [phone]   Fax:[phone]    HPI: Crescencio Hernandez is a(n) 39y.o. year old male with medical history of obesity (BMI 35), diabetes mellitus type 2 diet controlled and GERD presents with epigastric abdominal pain of 1 day duration. Found to have acute interstitial pancreatitis due to severe hypertriglyceridemia.     We are following for acute interstitial pancreatitis due to hypertriglyceridemia. Patient was seen and examined about 1:20 p.m during plasmapheresis. Reports mild improvement in epigastric pain. Denies nausea, vomiting, fever, chills,  constipation, diarrhea, hematochezia or melenic stools. Current Hospital Schedued Meds   albumin human  2,750 mL Intravenous Once    calcium gluconate IVPB  3 g Intravenous Once    citrate dextrose   Intracatheter Once    potassium phosphate IVPB  30 mmol Intravenous Once    magnesium sulfate  2 g Intravenous Once    fenofibrate  160 mg Oral Daily    mupirocin   Nasal BID    insulin lispro  0-12 Units Subcutaneous Q4H    pantoprazole  40 mg Intravenous Daily    sodium chloride flush  10 mL Intravenous 2 times per day    enoxaparin  40 mg Subcutaneous Daily     Current Hospital IV Meds   sodium chloride      dextrose      lactated ringers 125 mL/hr at 01/09/21 1114     Current Hospital Prn Meds  potassium chloride **OR** potassium alternative oral replacement **OR** potassium chloride, sodium phosphate IVPB **OR** sodium phosphate IVPB, glucose, dextrose, glucagon (rDNA), dextrose, heparin (porcine), sodium chloride flush, magnesium sulfate, acetaminophen, ondansetron, HYDROmorphone **OR** HYDROmorphone    I/O last 3 completed shifts:   In: 5808 [I.V.:4900]  Out: 5281 [Urine:1575]  BP (!) 143/84   Pulse 116   Temp 97.7 °F (36.5 °C) (Oral)   Resp 12   Ht 5' 8\" (1.727 m)   Wt 262 lb (118.8 kg)   SpO2 93%   BMI 39.84 kg/m²   BMs: 0  Wt Readings from Last 3 Encounters:   01/09/21 262 lb (118.8 kg)       Physical Exam:  VITAL SIGNS: BP (!) 143/84   Pulse 116   Temp 97.7 °F (36.5 °C) (Oral)   Resp 12   Ht 5' 8\" (1.727 m)   Wt 262 lb (118.8 kg)   SpO2 93%   BMI 39.84 kg/m²   Wt Readings from Last 3 Encounters:   01/09/21 262 lb (118.8 kg)     Constitutional: Obese male. Well developed, Well nourished, No acute distress, Non-toxic appearance. HENT: Normocephalic, Atraumatic, Bilateral external ears normal, Oropharynx moist, No oral exudates, Nose normal.   Eyes: Conjunctiva normal, No discharge. Neck: Normal range of motion, No tenderness, Supple, No stridor. Cardiovascular: Normal heart rate, Normal rhythm, No murmurs, No rubs, No gallops. Thorax & Lungs: Normal breath sounds, No respiratory distress, No wheezing, No chest tenderness. Abdomen: Pannus abdomen, normal bowel sounds, soft, mild tender in bilateral lower abdomen, non distended, no hernias,    Rectal:  Deferred. Skin: Warm, Dry, No erythema, No rash. No bruising. No spider hemangiomas. Lower Extremities: Intact distal pulses, No edema, No tenderness, No cyanosis  Neurologic: Alert & oriented x 3, Normal motor function, Normal sensory function, No focal deficits noted.        Lab Results   Component Value Date    ALT 11 01/09/2021    AST 9 (L) 01/09/2021    ALKPHOS 40 01/09/2021    BILIDIR 0.3 01/09/2021    PROT 5.5 (L) 01/09/2021    LABALBU 3.5 01/09/2021    INR 1.17 (H) 01/08/2021    LIPASE 77.0 (H) 01/09/2021     Lab Results   Component Value Date    WBC 12.0 (H) 01/08/2021    HGB 15.8 01/08/2021    HCT 46.4 01/08/2021    MCV 86.3 01/08/2021     01/08/2021     Lab Results   Component Value Date     01/09/2021    K 3.6 01/09/2021    K 3.9 01/08/2021     01/09/2021    CO2 23 01/09/2021    BUN 9 01/09/2021     Lab Results   Component Value Date    CREATININE <0.5 (L) 01/09/2021 CT abdomen pelvis on 1/7/2021 acute pancreatitis with peripancreatic fluid and fat stranding.  Fatty infiltration of liver and diverticulosis without diverticulitis.     Ultrasound right upper quadrant on 1/7/2021 noted fatty infiltration of liver, normal gallbladder, normal common bile duct to 5 mm.     Lipid panel noted for elevated triglycerides > 4425 g/dL and total cholesterol 976     A/P  1.  Acute interstitial pancreatitis  due to hypertriglyceridemia (very severe TG > 2000 g/dL). Ruled out biliary pancreatitis; less likely alcoholic pancreatitis and hypercalcemia induced pancreatitis.      Plan;   Continue therapeutic plasma exchange per nephrology recommendations. Continue lactated Ringer's at 125 ml/h  May start on clear liquid diet and advance to low fat diet as tolerated. Pain control  Lipid control with Fenofibrate 160 mg orally daily. GI to follow     Active Problems:    Acute pancreatitis    Hyperglycemia    Gastroesophageal reflux disease without esophagitis    Obesity (BMI 30.0-34. 9)    Type 2 diabetes mellitus with hyperglycemia, without long-term current use of insulin (HCC)    Hypertriglyceridemia  Resolved Problems:    * No resolved hospital problems. *    Patient Active Problem List   Diagnosis Code    Acute pancreatitis K85.90    Hyperglycemia R73.9    Gastroesophageal reflux disease without esophagitis K21.9    Obesity (BMI 30.0-34. 9) E66.9    Type 2 diabetes mellitus with hyperglycemia, without long-term current use of insulin (HCC) E11.65    Hypertriglyceridemia E78.1       Thank you for involving [facility] Gastroenterology in the hospital care of William Walden. For further questions or concerns, we can best be reached through perfect serv.         Kierra Carcamo 1/9/21 12:48 PM EST

## 2021-01-10 LAB
ALBUMIN SERPL-MCNC: 3.4 G/DL (ref 3.4–5)
ALP BLD-CCNC: 44 U/L (ref 40–129)
ALT SERPL-CCNC: 6 U/L (ref 10–40)
ANION GAP SERPL CALCULATED.3IONS-SCNC: 12 MMOL/L (ref 3–16)
AST SERPL-CCNC: 8 U/L (ref 15–37)
BANDED NEUTROPHILS RELATIVE PERCENT: 4 % (ref 0–7)
BASOPHILS ABSOLUTE: 0 K/UL (ref 0–0.2)
BASOPHILS ABSOLUTE: 0 K/UL (ref 0–0.2)
BASOPHILS RELATIVE PERCENT: 0 %
BASOPHILS RELATIVE PERCENT: 0 %
BILIRUB SERPL-MCNC: 0.6 MG/DL (ref 0–1)
BILIRUBIN DIRECT: <0.2 MG/DL (ref 0–0.3)
BILIRUBIN, INDIRECT: ABNORMAL MG/DL (ref 0–1)
BUN BLDV-MCNC: 9 MG/DL (ref 7–20)
CALCIUM IONIZED: 1.09 MMOL/L (ref 1.12–1.32)
CALCIUM SERPL-MCNC: 8.1 MG/DL (ref 8.3–10.6)
CHLORIDE BLD-SCNC: 99 MMOL/L (ref 99–110)
CO2: 23 MMOL/L (ref 21–32)
CREAT SERPL-MCNC: <0.5 MG/DL (ref 0.9–1.3)
EOSINOPHILS ABSOLUTE: 0 K/UL (ref 0–0.6)
EOSINOPHILS ABSOLUTE: 0.1 K/UL (ref 0–0.6)
EOSINOPHILS RELATIVE PERCENT: 0 %
EOSINOPHILS RELATIVE PERCENT: 1 %
GFR AFRICAN AMERICAN: >60
GFR NON-AFRICAN AMERICAN: >60
GLUCOSE BLD-MCNC: 180 MG/DL (ref 70–99)
GLUCOSE BLD-MCNC: 191 MG/DL (ref 70–99)
GLUCOSE BLD-MCNC: 193 MG/DL (ref 70–99)
GLUCOSE BLD-MCNC: 196 MG/DL (ref 70–99)
GLUCOSE BLD-MCNC: 211 MG/DL (ref 70–99)
GLUCOSE BLD-MCNC: 238 MG/DL (ref 70–99)
GLUCOSE BLD-MCNC: 259 MG/DL (ref 70–99)
HCT VFR BLD CALC: 40.6 % (ref 40.5–52.5)
HCT VFR BLD CALC: 42.3 % (ref 40.5–52.5)
HEMOGLOBIN: 13.8 G/DL (ref 13.5–17.5)
HEMOGLOBIN: 13.9 G/DL (ref 13.5–17.5)
LIPASE: 42 U/L (ref 13–60)
LYMPHOCYTES ABSOLUTE: 1 K/UL (ref 1–5.1)
LYMPHOCYTES ABSOLUTE: 1.7 K/UL (ref 1–5.1)
LYMPHOCYTES RELATIVE PERCENT: 13 %
LYMPHOCYTES RELATIVE PERCENT: 8 %
MAGNESIUM: 2 MG/DL (ref 1.8–2.4)
MCH RBC QN AUTO: 28.6 PG (ref 26–34)
MCH RBC QN AUTO: 28.9 PG (ref 26–34)
MCHC RBC AUTO-ENTMCNC: 32.9 G/DL (ref 31–36)
MCHC RBC AUTO-ENTMCNC: 34 G/DL (ref 31–36)
MCV RBC AUTO: 84.8 FL (ref 80–100)
MCV RBC AUTO: 86.8 FL (ref 80–100)
MONOCYTES ABSOLUTE: 0.4 K/UL (ref 0–1.3)
MONOCYTES ABSOLUTE: 0.5 K/UL (ref 0–1.3)
MONOCYTES RELATIVE PERCENT: 3 %
MONOCYTES RELATIVE PERCENT: 4 %
NEUTROPHILS ABSOLUTE: 10.5 K/UL (ref 1.7–7.7)
NEUTROPHILS ABSOLUTE: 11.7 K/UL (ref 1.7–7.7)
NEUTROPHILS RELATIVE PERCENT: 78 %
NEUTROPHILS RELATIVE PERCENT: 89 %
PDW BLD-RTO: 14 % (ref 12.4–15.4)
PDW BLD-RTO: 14.3 % (ref 12.4–15.4)
PERFORMED ON: ABNORMAL
PH VENOUS: 7.37 (ref 7.35–7.45)
PHOSPHORUS: 1.5 MG/DL (ref 2.5–4.9)
PLATELET # BLD: 204 K/UL (ref 135–450)
PLATELET # BLD: 215 K/UL (ref 135–450)
PLATELET SLIDE REVIEW: ADEQUATE
PMV BLD AUTO: 7.3 FL (ref 5–10.5)
PMV BLD AUTO: 8.2 FL (ref 5–10.5)
POTASSIUM SERPL-SCNC: 3.6 MMOL/L (ref 3.5–5.1)
RBC # BLD: 4.79 M/UL (ref 4.2–5.9)
RBC # BLD: 4.88 M/UL (ref 4.2–5.9)
RBC # BLD: NORMAL 10*6/UL
SLIDE REVIEW: ABNORMAL
SLIDE REVIEW: ABNORMAL
SODIUM BLD-SCNC: 134 MMOL/L (ref 136–145)
TOTAL PROTEIN: 5.4 G/DL (ref 6.4–8.2)
TRIGL SERPL-MCNC: 241 MG/DL (ref 0–150)
TRIGL SERPL-MCNC: 400 MG/DL (ref 0–150)
WBC # BLD: 12.8 K/UL (ref 4–11)
WBC # BLD: 13.1 K/UL (ref 4–11)

## 2021-01-10 PROCEDURE — 80076 HEPATIC FUNCTION PANEL: CPT

## 2021-01-10 PROCEDURE — 84478 ASSAY OF TRIGLYCERIDES: CPT

## 2021-01-10 PROCEDURE — 99232 SBSQ HOSP IP/OBS MODERATE 35: CPT | Performed by: INTERNAL MEDICINE

## 2021-01-10 PROCEDURE — 6360000002 HC RX W HCPCS: Performed by: INTERNAL MEDICINE

## 2021-01-10 PROCEDURE — 83735 ASSAY OF MAGNESIUM: CPT

## 2021-01-10 PROCEDURE — 80069 RENAL FUNCTION PANEL: CPT

## 2021-01-10 PROCEDURE — 2580000003 HC RX 258: Performed by: INTERNAL MEDICINE

## 2021-01-10 PROCEDURE — 36415 COLL VENOUS BLD VENIPUNCTURE: CPT

## 2021-01-10 PROCEDURE — 6370000000 HC RX 637 (ALT 250 FOR IP): Performed by: INTERNAL MEDICINE

## 2021-01-10 PROCEDURE — 2500000003 HC RX 250 WO HCPCS: Performed by: INTERNAL MEDICINE

## 2021-01-10 PROCEDURE — 2060000000 HC ICU INTERMEDIATE R&B

## 2021-01-10 PROCEDURE — C9113 INJ PANTOPRAZOLE SODIUM, VIA: HCPCS | Performed by: INTERNAL MEDICINE

## 2021-01-10 PROCEDURE — 83690 ASSAY OF LIPASE: CPT

## 2021-01-10 PROCEDURE — 82330 ASSAY OF CALCIUM: CPT

## 2021-01-10 PROCEDURE — 85025 COMPLETE CBC W/AUTO DIFF WBC: CPT

## 2021-01-10 RX ORDER — HYDROCODONE BITARTRATE AND ACETAMINOPHEN 5; 325 MG/1; MG/1
1 TABLET ORAL EVERY 6 HOURS PRN
Status: DISCONTINUED | OUTPATIENT
Start: 2021-01-10 | End: 2021-01-11

## 2021-01-10 RX ORDER — INSULIN GLARGINE 100 [IU]/ML
25 INJECTION, SOLUTION SUBCUTANEOUS NIGHTLY
Status: DISCONTINUED | OUTPATIENT
Start: 2021-01-10 | End: 2021-01-11 | Stop reason: HOSPADM

## 2021-01-10 RX ADMIN — ENOXAPARIN SODIUM 40 MG: 40 INJECTION SUBCUTANEOUS at 08:03

## 2021-01-10 RX ADMIN — INSULIN GLARGINE 25 UNITS: 100 INJECTION, SOLUTION SUBCUTANEOUS at 21:43

## 2021-01-10 RX ADMIN — MUPIROCIN: 20 OINTMENT TOPICAL at 08:04

## 2021-01-10 RX ADMIN — HYDROMORPHONE HYDROCHLORIDE 1 MG: 1 INJECTION, SOLUTION INTRAMUSCULAR; INTRAVENOUS; SUBCUTANEOUS at 11:43

## 2021-01-10 RX ADMIN — HYDROMORPHONE HYDROCHLORIDE 1 MG: 1 INJECTION, SOLUTION INTRAMUSCULAR; INTRAVENOUS; SUBCUTANEOUS at 02:34

## 2021-01-10 RX ADMIN — INSULIN LISPRO 6 UNITS: 100 INJECTION, SOLUTION INTRAVENOUS; SUBCUTANEOUS at 05:02

## 2021-01-10 RX ADMIN — HYDROCODONE BITARTRATE AND ACETAMINOPHEN 1 TABLET: 5; 325 TABLET ORAL at 23:21

## 2021-01-10 RX ADMIN — INSULIN LISPRO 2 UNITS: 100 INJECTION, SOLUTION INTRAVENOUS; SUBCUTANEOUS at 08:04

## 2021-01-10 RX ADMIN — POTASSIUM PHOSPHATE, MONOBASIC AND POTASSIUM PHOSPHATE, DIBASIC 30 MMOL: 224; 236 INJECTION, SOLUTION INTRAVENOUS at 13:24

## 2021-01-10 RX ADMIN — HYDROMORPHONE HYDROCHLORIDE 1 MG: 1 INJECTION, SOLUTION INTRAMUSCULAR; INTRAVENOUS; SUBCUTANEOUS at 06:48

## 2021-01-10 RX ADMIN — MUPIROCIN: 20 OINTMENT TOPICAL at 20:47

## 2021-01-10 RX ADMIN — ACETAMINOPHEN 650 MG: 325 TABLET ORAL at 20:47

## 2021-01-10 RX ADMIN — PANTOPRAZOLE SODIUM 40 MG: 40 INJECTION, POWDER, FOR SOLUTION INTRAVENOUS at 08:02

## 2021-01-10 RX ADMIN — Medication 10 ML: at 08:04

## 2021-01-10 RX ADMIN — INSULIN LISPRO 2 UNITS: 100 INJECTION, SOLUTION INTRAVENOUS; SUBCUTANEOUS at 00:23

## 2021-01-10 RX ADMIN — SODIUM CHLORIDE, POTASSIUM CHLORIDE, SODIUM LACTATE AND CALCIUM CHLORIDE: 600; 310; 30; 20 INJECTION, SOLUTION INTRAVENOUS at 05:04

## 2021-01-10 RX ADMIN — HYDROCODONE BITARTRATE AND ACETAMINOPHEN 1 TABLET: 5; 325 TABLET ORAL at 17:19

## 2021-01-10 RX ADMIN — FENOFIBRATE 160 MG: 160 TABLET ORAL at 08:02

## 2021-01-10 RX ADMIN — Medication 10 ML: at 20:43

## 2021-01-10 ASSESSMENT — PAIN DESCRIPTION - LOCATION
LOCATION: ABDOMEN;BACK
LOCATION: ABDOMEN

## 2021-01-10 ASSESSMENT — PAIN SCALES - GENERAL
PAINLEVEL_OUTOF10: 4
PAINLEVEL_OUTOF10: 6
PAINLEVEL_OUTOF10: 3
PAINLEVEL_OUTOF10: 4
PAINLEVEL_OUTOF10: 7
PAINLEVEL_OUTOF10: 6

## 2021-01-10 ASSESSMENT — PAIN DESCRIPTION - PAIN TYPE
TYPE: ACUTE PAIN

## 2021-01-10 ASSESSMENT — PAIN DESCRIPTION - DESCRIPTORS: DESCRIPTORS: ACHING

## 2021-01-10 ASSESSMENT — PAIN DESCRIPTION - DIRECTION: RADIATING_TOWARDS: BACK

## 2021-01-10 ASSESSMENT — PAIN DESCRIPTION - ORIENTATION: ORIENTATION: POSTERIOR

## 2021-01-10 ASSESSMENT — PAIN DESCRIPTION - FREQUENCY
FREQUENCY: CONTINUOUS
FREQUENCY: CONTINUOUS

## 2021-01-10 NOTE — PROGRESS NOTES
Hospitalist Progress Note      PCP: Kentrell Thompson MD    Date of Admission: 1/7/2021    Subjective: transferred to ICU yesterday, started on insulin gtt, received plasmapheresis yesterday. Medications:  Reviewed    Infusion Medications    dextrose      lactated ringers 125 mL/hr at 01/09/21 2014     Scheduled Medications    fenofibrate  160 mg Oral Daily    mupirocin   Nasal BID    insulin lispro  0-12 Units Subcutaneous Q4H    pantoprazole  40 mg Intravenous Daily    sodium chloride flush  10 mL Intravenous 2 times per day    enoxaparin  40 mg Subcutaneous Daily     PRN Meds: potassium chloride **OR** potassium alternative oral replacement **OR** potassium chloride, sodium phosphate IVPB **OR** sodium phosphate IVPB, glucose, dextrose, glucagon (rDNA), dextrose, heparin (porcine), sodium chloride flush, magnesium sulfate, acetaminophen, ondansetron, HYDROmorphone **OR** HYDROmorphone      Intake/Output Summary (Last 24 hours) at 1/10/2021 0005  Last data filed at 1/9/2021 2024  Gross per 24 hour   Intake 6325 ml   Output 4482 ml   Net 1843 ml       Physical Exam Performed:    /82   Pulse 109   Temp 99.4 °F (37.4 °C) (Oral)   Resp 16   Ht 5' 8\" (1.727 m)   Wt 262 lb (118.8 kg)   SpO2 92%   BMI 39.84 kg/m²     Gen: No distress. Alert. Middle aged  male, obese, appears uncomfortable with frequent positional changes  Eyes: No sclera icterus. No conjunctival injection. Glasses   Neck: Trachea midline. Resp: No accessory muscle use. No crackles. No wheezes. No rhonchi. On RA  CV: Regular rate. Regular rhythm. No murmur.  No rub. No edema. Peripheral Pulses: +2 palpable, equal bilaterally   GI: Soft, obese, diffusely tender - greatest in epigastric region. Non-distended. Normal bowel sounds. Skin: Warm and dry. No rash on exposed extremities. Neuro: Awake. Grossly nonfocal, no aphasia, follows commands, moves extremities symmetrically.    Psych: Oriented x 3.  No anxiety or agitation. Labs:   Recent Labs     01/07/21  0306 01/08/21  0518   WBC 14.3* 12.0*   HGB 16.5 15.8   HCT 48.4 46.4    212     Recent Labs     01/09/21  0505 01/09/21  1110 01/09/21  1652   * 135* 135*   K 3.3* 3.6 3.8   CL 94* 101 102   CO2 22 23 22   BUN 10 9 8   CREATININE <0.5* <0.5* <0.5*   CALCIUM 8.4 8.1* 8.1*   PHOS 1.3* 1.3* 2.1*     Recent Labs     01/07/21  0306 01/08/21  0518 01/09/21  0505   AST see below see below 9*   ALT see below see below 11   BILIDIR  --   --  0.3   BILITOT 0.5 0.6 0.9   ALKPHOS 98 68 40     Recent Labs     01/08/21  1228   INR 1.17*     No results for input(s): Miguel Santo in the last 72 hours. Urinalysis:    No results found for: Lindon Candle, BACTERIA, RBCUA, BLOODU, [address], [address] [place] [address]    Radiology:  IR NONTUNNELED VASCULAR CATHETER > 5 YEARS   Final Result   Successful ultrasound and fluoroscopy guided non-tunneled right internal   jugular dialysis catheter placement. US GALLBLADDER RUQ   Final Result   Fatty infiltration of the liver. CT ABDOMEN PELVIS W IV CONTRAST Additional Contrast? None   Final Result   1. Acute pancreatitis. 2. Diverticulosis without scan evidence for diverticulitis. Assessment/Plan:    Active Hospital Problems    Diagnosis    Type 2 diabetes mellitus with hyperglycemia, without long-term current use of insulin (HCC) [E11.65]    Hypertriglyceridemia [E78.1]    Acute pancreatitis [K85.90]    Hyperglycemia [R73.9]    Gastroesophageal reflux disease without esophagitis [K21.9]    Obesity (BMI 30.0-34. 9) [E66.9]         Acute Pancreatitis 2/2  Hypertriglyceridemia  - denies alcohol abuse  - Admitted to Med Surg   - RUQ U/S - showed fatty liver, trigs - >4,425  - medications reviewed  - NPO, IVF, PRN Dilaudid, PRN Zofran  - GI consulted  - transfered to ICU for insulin gtt, started Fenofibrate and trend Trig q6hrs.   - plasmapheresis done on 1/8 by nephrology  - triglyceride improved

## 2021-01-10 NOTE — PROGRESS NOTES
[facility]       Progress Note           Subjective/   39y.o. year old male who we are seeing in consultation for hypertriglyceridemia. HPI:  Underwent PLEX on 1/8, 1/9. States abdominal pain much better, 2/10. ROS:  Intake better, no sob.     Objective/   GEN:  Chronically ill, /78   Pulse 103   Temp 98 °F (36.7 °C) (Oral)   Resp 16   Ht 5' 8\" (1.727 m)   Wt 263 lb 1.6 oz (119.3 kg)   SpO2 97%   BMI 40.00 kg/m²   HEENT: non-icteric, no JVD  CV: S1, S2 without m/r/g; no LE edema  RESP: CTA B without w/r/r; breathing wnl  ABD: +bs, soft, nt, no hsm  SKIN: warm, no rashes  ACCESS: R IJ vascath (1/8)    Data/  Recent Labs     01/08/21  0518   WBC 12.0*   HGB 15.8   HCT 46.4   MCV 86.3        Recent Labs     01/09/21  0505 01/09/21  1110 01/09/21  1652 01/10/21  0510   * 135* 135* 134*   K 3.3* 3.6 3.8 3.6   CL 94* 101 102 99   CO2 22 23 22 23   GLUCOSE 226* 239* 230* 211*   PHOS 1.3* 1.3* 2.1* 1.5*   MG 1.70*  --   --  2.00   BUN 10 9 8 9   CREATININE <0.5* <0.5* <0.5* <0.5*   LABGLOM >60 >60 >60 >60   GFRAA >60 >60 >60 >60       Assessment/     - Hypertriglyceridemia c/b acute pancreatitis   S/p PLEX on 1/8, 1/9     - Pseudohyponatremia - correction for high triglycerides results in SNa ~135     - DM - diet controlled     - Fatty liver     - Hypertension - trending higher with pain, better now    Plan/     - Trend TG levels - overall symptoms better with two PLEX treatments, no further plans for PLEX  - Likely to need chronic medical treatment as outpatient for DM & TG levels  - Can remove vascath tomorrow before discharge  - Prn phos replacement IV - trend electrolytes

## 2021-01-10 NOTE — PROGRESS NOTES
[facility]       Progress Note           Subjective/   39y.o. year old male who we are seeing in consultation for hypertriglyceridemia. HPI:  Underwent PLEX on 1/8, 1/9. States abdominal pain much better, 2/10. ROS:  Intake reduced, no sob. Objective/   GEN:  Chronically ill, /82   Pulse 109   Temp 99.4 °F (37.4 °C) (Oral)   Resp 16   Ht 5' 8\" (1.727 m)   Wt 262 lb (118.8 kg)   SpO2 92%   BMI 39.84 kg/m²   HEENT: non-icteric, no JVD  CV: S1, S2 without m/r/g; no LE edema  RESP: CTA B without w/r/r; breathing wnl  ABD: +bs, soft, nt, no hsm  SKIN: warm, no rashes  ACCESS: R CALLIE fisher (1/8)    Data/  Recent Labs     01/07/21  0306 01/08/21  0518   WBC 14.3* 12.0*   HGB 16.5 15.8   HCT 48.4 46.4   MCV 83.8 86.3    212     Recent Labs     01/07/21  0306 01/07/21  0306 01/09/21  0505 01/09/21  1110 01/09/21  1652   *   < > 126* 135* 135*   K 4.1   < > 3.3* 3.6 3.8   CL 95*   < > 94* 101 102   CO2 17*   < > 22 23 22   GLUCOSE 329*   < > 226* 239* 230*   PHOS 3.5   < > 1.3* 1.3* 2.1*   MG 2.00  --  1.70*  --   --    BUN 10   < > 10 9 8   CREATININE 0.6*   < > <0.5* <0.5* <0.5*   LABGLOM >60   < > >60 >60 >60   GFRAA >60   < > >60 >60 >60    < > = values in this interval not displayed.        Assessment/     - Hypertriglyceridemia c/b acute pancreatitis   S/p PLEX on 1/8, 1/9     - Pseudohyponatremia - correction for high triglycerides results in SNa ~135     - DM - diet controlled     - Fatty liver     - Hypertension - trending higher with pain, better now    Plan/     - Trend TG levels, bp's, & overall symptoms with above treatments, no further plans for PLEX  - Likely to need chronic medical treatment as outpatient for DM & TG levels

## 2021-01-10 NOTE — PROGRESS NOTES
Hospitalist Progress Note      PCP: Eva Murillo MD    Date of Admission: 1/7/2021     Subjective: cont to state pain not well controlled, tolerating clear liquids    Medications:  Reviewed    Infusion Medications    dextrose       Scheduled Medications    insulin lispro  0-6 Units Subcutaneous TID WC    insulin lispro  0-3 Units Subcutaneous Nightly    insulin glargine  25 Units Subcutaneous Nightly    potassium phosphate IVPB  30 mmol Intravenous Once    fenofibrate  160 mg Oral Daily    mupirocin   Nasal BID    pantoprazole  40 mg Intravenous Daily    sodium chloride flush  10 mL Intravenous 2 times per day    enoxaparin  40 mg Subcutaneous Daily     PRN Meds: HYDROcodone 5 mg - acetaminophen, potassium chloride **OR** potassium alternative oral replacement **OR** potassium chloride, sodium phosphate IVPB **OR** sodium phosphate IVPB, glucose, dextrose, glucagon (rDNA), dextrose, heparin (porcine), sodium chloride flush, magnesium sulfate, acetaminophen, ondansetron, HYDROmorphone **OR** HYDROmorphone      Intake/Output Summary (Last 24 hours) at 1/10/2021 1720  Last data filed at 1/10/2021 0818  Gross per 24 hour   Intake 4016 ml   Output 550 ml   Net 3466 ml       Physical Exam Performed:    /72   Pulse 104   Temp 98.1 °F (36.7 °C) (Oral)   Resp 12   Ht 5' 8\" (1.727 m)   Wt 263 lb 1.6 oz (119.3 kg)   SpO2 97%   BMI 40.00 kg/m²     Gen: No distress. Alert. NAD  Eyes: No sclera icterus. No conjunctival injection. Glasses   Neck: Trachea midline. Resp: No accessory muscle use. No crackles. No wheezes. No rhonchi. On RA  CV: Regular rate. Regular rhythm. No murmur.  No rub. No edema. Peripheral Pulses: +2 palpable, equal bilaterally   GI: Soft, obese, diffusely tender - greatest in epigastric region. Non-distended. Normal bowel sounds. Skin: Warm and dry. No rash on exposed extremities. Neuro: Awake.  Grossly nonfocal, no aphasia, follows commands, moves extremities plasmapheresis done on 1/8 and 1/9 by nephrology  - triglyceride improved from 4425-->1149-->423-->241     Hyponatremia  - in setting of hyperglycemia and hypertriglyceridemia  - improved to 134  - mgmt of sugars as below, IVF     Leukocytosis  - w/ no obvious source of infection  - favor reactive  - repeat CBC  - improved to 12     New Onset Type II DM - not in DKA  - pt states he was pre-diabetic prior to this stay  - check Hgb A1c - 9.0  - on low dose SSI   - will start lantus/metformin  - needs closer f/u with PCP     GERD  - cont PPI     Obesity  - Body mass index is 34.97 kg/m².   - Counseled on weight loss.            DVT Prophylaxis: Lovenox  Diet: DIET LOW FAT; Carb Control: 4 carb choices (60 gms)/meal  Code Status: Full Code    PT/OT Eval Status: ordered    Dispo - cont care, maria a rosa in am if tolerating PO diet    Tamie Lin MD

## 2021-01-10 NOTE — PROGRESS NOTES
Updated patient's wife, Pippa Srivastava. Patient currently resting in bed. He states that he has been walking to the bathroom himself and generally feels like he is improving. VSS at this time. Call light in reach.

## 2021-01-10 NOTE — PROGRESS NOTES
[address] ,  [address] [facility]  Phone: [phone]  [facility],  [address] [place] [address], [address] [place] [address]  Phone: [phone]    HPI: Maria Fernanda Ellison is a(n) 39y.o. year old male with medical history of obesity (BMI 35), diabetes mellitus type 2 diet controlled and GERD presents with epigastric abdominal pain of 1 day duration.  Found to have acute interstitial pancreatitis due to severe hypertriglyceridemia.     We are following for acute interstitial pancreatitis due to hypertriglyceridemia.     Patient was seen and examined about 12 p.m. Low-grade fever spikes T-max 100.7 F yesterday. Reports improved abdominal pain to about 2 out of 10 in intensity. Passing flatus and and bowel movements overnight. Denies nausea, vomiting, fever, chills, constipation, diarrhea, hematochezia or melenic stools. Current Hospital Schedued Meds   fenofibrate  160 mg Oral Daily    mupirocin   Nasal BID    insulin lispro  0-12 Units Subcutaneous Q4H    pantoprazole  40 mg Intravenous Daily    sodium chloride flush  10 mL Intravenous 2 times per day    enoxaparin  40 mg Subcutaneous Daily     Current Hospital IV Meds   dextrose      lactated ringers 125 mL/hr at 01/10/21 0504     Current Hospital Prn Meds  potassium chloride **OR** potassium alternative oral replacement **OR** potassium chloride, sodium phosphate IVPB **OR** sodium phosphate IVPB, glucose, dextrose, glucagon (rDNA), dextrose, heparin (porcine), sodium chloride flush, magnesium sulfate, acetaminophen, ondansetron, HYDROmorphone **OR** HYDROmorphone    I/O last 3 completed shifts: In: 9451 [P.O.:240;  I.V.:3476]  Out: 4532 [Urine:900]  /78   Pulse 103   Temp 98 °F (36.7 °C) (Oral)   Resp 16   Ht 5' 8\" (1.727 m)   Wt 263 lb 1.6 oz (119.3 kg)   SpO2 97%   BMI 40.00 kg/m²   BMs: 1  Wt Readings from Last 3 Encounters:   01/10/21 263 lb 1.6 oz (119.3 kg) Physical Exam:  VITAL SIGNS: /78   Pulse 103   Temp 98 °F (36.7 °C) (Oral)   Resp 16   Ht 5' 8\" (1.727 m)   Wt 263 lb 1.6 oz (119.3 kg)   SpO2 97%   BMI 40.00 kg/m²   Wt Readings from Last 3 Encounters:   01/10/21 263 lb 1.6 oz (119.3 kg)     Constitutional: Obese male, well developed, Well nourished, No acute distress, Non-toxic appearance. HENT: Normocephalic, Atraumatic, Bilateral external ears normal, Oropharynx moist, No oral exudates, Nose normal.   Eyes: Conjunctiva normal, No discharge. Neck: Normal range of motion, No tenderness, Supple, No stridor. Cardiovascular: Normal heart rate, Normal rhythm, No murmurs, No rubs, No gallops. Thorax & Lungs: Normal breath sounds, No respiratory distress, No wheezing, No chest tenderness. Abdomen: Pannus abdomen, normal bowel sounds, soft, mild tenderness in lower flanks, non distended, no hernias  Rectal:  Deferred. Skin: Warm, Dry, No erythema, No rash. No bruising. No spider hemangiomas. Back: No tenderness, No CVA tenderness.    Lower Extremities: Intact distal pulses, No edema, No tenderness  Neurologic: Alert & oriented x 3,       Lab Results   Component Value Date    ALT 6 (L) 01/10/2021    AST 8 (L) 01/10/2021    ALKPHOS 44 01/10/2021    BILIDIR <0.2 01/10/2021    PROT 5.4 (L) 01/10/2021    LABALBU 3.4 01/10/2021    INR 1.17 (H) 01/08/2021    LIPASE 42.0 01/10/2021     Lab Results   Component Value Date    WBC 12.0 (H) 01/08/2021    HGB 15.8 01/08/2021    HCT 46.4 01/08/2021    MCV 86.3 01/08/2021     01/08/2021     Lab Results   Component Value Date     01/10/2021    K 3.6 01/10/2021    K 3.9 01/08/2021    CL 99 01/10/2021    CO2 23 01/10/2021    BUN 9 01/10/2021     Lab Results   Component Value Date    CREATININE <0.5 (L) 01/10/2021       CT abdomen pelvis on 1/7/2021 acute pancreatitis with peripancreatic fluid and fat stranding.  Fatty infiltration of liver and diverticulosis without diverticulitis.     Ultrasound right upper quadrant on 1/7/2021 noted fatty infiltration of liver, normal gallbladder, normal common bile duct to 5 mm.     Lipid panel noted for elevated triglycerides > 4425 g/dL and total cholesterol 976     A/P  1.  Acute interstitial pancreatitis  due to hypertriglyceridemia (very severe TG > 2000 g/dL)     Plan;   Continue therapeutic plasma exchange per nephrology recommendations. Low fat diet as tolerated. Pain control  Fenofibrate 160 mg orally daily. Patient counseled on dietary modification and exercise with ultimate goal of weight loss.     GI, Dr. Burnette Erp to take over patient's care starting tomorrow 1/11/2021    Active Problems:    Acute pancreatitis    Hyperglycemia    Gastroesophageal reflux disease without esophagitis    Obesity (BMI 30.0-34. 9)    Type 2 diabetes mellitus with hyperglycemia, without long-term current use of insulin (HCC)    Hypertriglyceridemia  Resolved Problems:    * No resolved hospital problems. *    Patient Active Problem List   Diagnosis Code    Acute pancreatitis K85.90    Hyperglycemia R73.9    Gastroesophageal reflux disease without esophagitis K21.9    Obesity (BMI 30.0-34. 9) E66.9    Type 2 diabetes mellitus with hyperglycemia, without long-term current use of insulin (HCC) E11.65    Hypertriglyceridemia E78.1       Thank you for involving [facility] Gastroenterology in the hospital care of Brigida Mckeon. For further questions or concerns, we can best be reached through perfect serv.         Oscar Oquendo 1/10/21 9:33 AM EST

## 2021-01-10 NOTE — PROGRESS NOTES
Pulmonary & Critical Care Medicine Progress Note    CC: Acute pancreatitis, hypertriglyceridemia    Events of Last 24 hours: Patient feels better, less abd pain. Had a BM. Invasive Lines: IV: 2021 right IJ HD catheter    IV:   dextrose      lactated ringers 125 mL/hr at 01/10/21 0504       Vitals:  Blood pressure 121/75, pulse 108, temperature 98.6 °F (37 °C), temperature source Oral, resp. rate 16, height 5' 8\" (1.727 m), weight 263 lb 1.6 oz (119.3 kg), SpO2 95 %. on room air  Temp  Av.2 °F (37.3 °C)  Min: 97.7 °F (36.5 °C)  Max: 100.7 °F (38.2 °C)    Intake/Output Summary (Last 24 hours) at 1/10/2021 0703  Last data filed at 1/10/2021 9652  Gross per 24 hour   Intake 6495 ml   Output 4532 ml   Net 1963 ml     EXAM:  General: NCAT , no acute distress  Eyes: PERRL. No sclera icterus. No conjunctival injection. ENT: No discharge. Pharynx clear. Neck: Trachea midline. Normal thyroid. Resp: No accessory muscle use. No crackles. No wheezing. No rhonchi. CV: Regular rate. Regular rhythm. No mumur or rub. Trace edema  GI: Mildly tender. Non-distended. No masses. Skin: Warm and dry.     Neuro: CN grossly intact, motor and sensation intact    Scheduled Meds:   fenofibrate  160 mg Oral Daily    mupirocin   Nasal BID    insulin lispro  0-12 Units Subcutaneous Q4H    pantoprazole  40 mg Intravenous Daily    sodium chloride flush  10 mL Intravenous 2 times per day    enoxaparin  40 mg Subcutaneous Daily     PRN Meds:  potassium chloride **OR** potassium alternative oral replacement **OR** potassium chloride, sodium phosphate IVPB **OR** sodium phosphate IVPB, glucose, dextrose, glucagon (rDNA), dextrose, heparin (porcine), sodium chloride flush, magnesium sulfate, acetaminophen, ondansetron, HYDROmorphone **OR** HYDROmorphone    Results:  CBC:   Recent Labs     21  0518   WBC 12.0*   HGB 15.8   HCT 46.4   MCV 86.3        BMP:   Recent Labs     21  1110 21  9956 01/10/21  0510   * 135* 134*   K 3.6 3.8 3.6    102 99   CO2 23 22 23   PHOS 1.3* 2.1* 1.5*   BUN 9 8 9   CREATININE <0.5* <0.5* <0.5*     LIVER PROFILE:   Recent Labs     01/08/21  0518 01/09/21  0505 01/10/21  0510   AST see below 9* 8*   ALT see below 11 6*   LIPASE  --  77.0* 42.0   BILIDIR  --  0.3 <0.2   BILITOT 0.6 0.9 0.6   ALKPHOS 68 40 44     Cultures:      1/7/2020 SARS-CoV-2 negative    Imaging  Abdominal CT 1/7/2020 lung windows are clear  Impression   1. Acute pancreatitis. 2. Diverticulosis without scan evidence for diverticulitis. Fatty liver      ASSESSMENT:  · Acute pancreatitis   · Hypertriglyceridemia  · Mild hyponatremia -this is artifactual, using correction formula for hypertriglyceridemia, serum sodium yesterday was 135  · Diabetes mellitus  · Fatty liver    PLAN:  · IV fluids - LR  · TPE per nephrology   · Follow serum triglycerides  · Prophylaxis: Lovenox, protonix, bactroban  · We will sign off. Please call with questions.

## 2021-01-10 NOTE — PROGRESS NOTES
Report given to Alfonso Bocanegra Care transferred at this time. Patient napping with no complaints. Call light in reach.

## 2021-01-10 NOTE — FLOWSHEET NOTE
01/09/21 2030   Vital Signs   Temp 99.4 °F (37.4 °C)   Temp Source Oral   Pulse 109   Heart Rate Source Monitor   Resp 16   /82   BP Location Left upper arm   Level of Consciousness Alert (0)   MEWS Score 2   Oxygen Therapy   SpO2 92 %   O2 Device None (Room air)   Patient assessment complete, abdomen distended, BS active, pain right flank radiates to mid back. Tolerating clears, will continue to monitor. Call light in reach, bed alarm on.

## 2021-01-10 NOTE — PROGRESS NOTES
Pain from abdomen to back, requested dilaudid, third dose given tonight, denies further needs, good UOP. Call light in reach, IV's intact.

## 2021-01-10 NOTE — FLOWSHEET NOTE
01/10/21 0728   Vital Signs   Temp 98 °F (36.7 °C)   Temp Source Oral   Pulse 103   Heart Rate Source Monitor   Resp 16   /78   BP Location Left Arm   Patient Position Semi fowlers   Level of Consciousness Alert (0)   MEWS Score 2   Pain Assessment   Pain Assessment 0-10   Pain Level 4   Pain Location Abdomen   Pain Orientation Posterior   Pain Type Acute pain   Pain Radiating Towards back   Oxygen Therapy   SpO2 97 %   O2 Device None (Room air)       Patient AxO. Still complaining of RLQ pain radiating to his back. States dilaudid helped take the edge off. Patient requesting to get vasc cath out of neck today. LR going at 125 ml/hr. Call light in reach.

## 2021-01-11 VITALS
SYSTOLIC BLOOD PRESSURE: 139 MMHG | WEIGHT: 261 LBS | OXYGEN SATURATION: 97 % | HEART RATE: 97 BPM | TEMPERATURE: 97.5 F | DIASTOLIC BLOOD PRESSURE: 88 MMHG | RESPIRATION RATE: 18 BRPM | BODY MASS INDEX: 39.56 KG/M2 | HEIGHT: 68 IN

## 2021-01-11 LAB
ALBUMIN SERPL-MCNC: 3.4 G/DL (ref 3.4–5)
ALBUMIN SERPL-MCNC: 3.6 G/DL (ref 3.4–5)
ALP BLD-CCNC: 64 U/L (ref 40–129)
ALT SERPL-CCNC: 11 U/L (ref 10–40)
ANION GAP SERPL CALCULATED.3IONS-SCNC: 11 MMOL/L (ref 3–16)
ANION GAP SERPL CALCULATED.3IONS-SCNC: 13 MMOL/L (ref 3–16)
AST SERPL-CCNC: 11 U/L (ref 15–37)
BILIRUB SERPL-MCNC: 0.6 MG/DL (ref 0–1)
BILIRUBIN DIRECT: <0.2 MG/DL (ref 0–0.3)
BILIRUBIN, INDIRECT: ABNORMAL MG/DL (ref 0–1)
BUN BLDV-MCNC: 8 MG/DL (ref 7–20)
BUN BLDV-MCNC: 8 MG/DL (ref 7–20)
CALCIUM SERPL-MCNC: 8.6 MG/DL (ref 8.3–10.6)
CALCIUM SERPL-MCNC: 8.7 MG/DL (ref 8.3–10.6)
CHLORIDE BLD-SCNC: 94 MMOL/L (ref 99–110)
CHLORIDE BLD-SCNC: 96 MMOL/L (ref 99–110)
CO2: 24 MMOL/L (ref 21–32)
CO2: 26 MMOL/L (ref 21–32)
CREAT SERPL-MCNC: 0.6 MG/DL (ref 0.9–1.3)
CREAT SERPL-MCNC: <0.5 MG/DL (ref 0.9–1.3)
GFR AFRICAN AMERICAN: >60
GFR AFRICAN AMERICAN: >60
GFR NON-AFRICAN AMERICAN: >60
GFR NON-AFRICAN AMERICAN: >60
GLUCOSE BLD-MCNC: 191 MG/DL (ref 70–99)
GLUCOSE BLD-MCNC: 201 MG/DL (ref 70–99)
GLUCOSE BLD-MCNC: 205 MG/DL (ref 70–99)
GLUCOSE BLD-MCNC: 212 MG/DL (ref 70–99)
GLUCOSE BLD-MCNC: 218 MG/DL (ref 70–99)
GLUCOSE BLD-MCNC: 219 MG/DL (ref 70–99)
MAGNESIUM: 2.2 MG/DL (ref 1.8–2.4)
PERFORMED ON: ABNORMAL
PHOSPHORUS: 1.8 MG/DL (ref 2.5–4.9)
PHOSPHORUS: 2.1 MG/DL (ref 2.5–4.9)
POTASSIUM SERPL-SCNC: 3.1 MMOL/L (ref 3.5–5.1)
POTASSIUM SERPL-SCNC: 3.4 MMOL/L (ref 3.5–5.1)
SODIUM BLD-SCNC: 131 MMOL/L (ref 136–145)
SODIUM BLD-SCNC: 133 MMOL/L (ref 136–145)
TOTAL PROTEIN: 5.9 G/DL (ref 6.4–8.2)

## 2021-01-11 PROCEDURE — 80076 HEPATIC FUNCTION PANEL: CPT

## 2021-01-11 PROCEDURE — 6370000000 HC RX 637 (ALT 250 FOR IP): Performed by: INTERNAL MEDICINE

## 2021-01-11 PROCEDURE — 80069 RENAL FUNCTION PANEL: CPT

## 2021-01-11 PROCEDURE — 6370000000 HC RX 637 (ALT 250 FOR IP): Performed by: PHYSICIAN ASSISTANT

## 2021-01-11 PROCEDURE — 99238 HOSP IP/OBS DSCHRG MGMT 30/<: CPT | Performed by: NURSE PRACTITIONER

## 2021-01-11 PROCEDURE — 83735 ASSAY OF MAGNESIUM: CPT

## 2021-01-11 PROCEDURE — 2580000003 HC RX 258: Performed by: INTERNAL MEDICINE

## 2021-01-11 PROCEDURE — C9113 INJ PANTOPRAZOLE SODIUM, VIA: HCPCS | Performed by: INTERNAL MEDICINE

## 2021-01-11 PROCEDURE — 6360000002 HC RX W HCPCS: Performed by: INTERNAL MEDICINE

## 2021-01-11 PROCEDURE — 99231 SBSQ HOSP IP/OBS SF/LOW 25: CPT | Performed by: INTERNAL MEDICINE

## 2021-01-11 RX ORDER — OXYCODONE HYDROCHLORIDE 5 MG/1
5 TABLET ORAL EVERY 4 HOURS PRN
Status: DISCONTINUED | OUTPATIENT
Start: 2021-01-11 | End: 2021-01-11 | Stop reason: HOSPADM

## 2021-01-11 RX ORDER — INSULIN GLARGINE 100 [IU]/ML
25 INJECTION, SOLUTION SUBCUTANEOUS NIGHTLY
Qty: 1 VIAL | Refills: 3 | Status: CANCELLED | OUTPATIENT
Start: 2021-01-11

## 2021-01-11 RX ORDER — LANCETS 28 GAUGE
1 EACH MISCELLANEOUS DAILY
Qty: 100 EACH | Refills: 3 | Status: SHIPPED | OUTPATIENT
Start: 2021-01-11

## 2021-01-11 RX ORDER — FENOFIBRATE 160 MG/1
160 TABLET ORAL DAILY
Qty: 30 TABLET | Refills: 3 | Status: SHIPPED | OUTPATIENT
Start: 2021-01-12

## 2021-01-11 RX ORDER — BLOOD-GLUCOSE METER
1 KIT MISCELLANEOUS DAILY
Qty: 1 KIT | Refills: 0 | Status: SHIPPED | OUTPATIENT
Start: 2021-01-11

## 2021-01-11 RX ORDER — OXYCODONE HYDROCHLORIDE 5 MG/1
5 TABLET ORAL EVERY 6 HOURS PRN
Qty: 12 TABLET | Refills: 0 | Status: SHIPPED | OUTPATIENT
Start: 2021-01-11 | End: 2021-01-14

## 2021-01-11 RX ORDER — ACETAMINOPHEN 325 MG/1
650 TABLET ORAL EVERY 8 HOURS SCHEDULED
Status: DISCONTINUED | OUTPATIENT
Start: 2021-01-11 | End: 2021-01-11 | Stop reason: HOSPADM

## 2021-01-11 RX ORDER — GLUCOSAMINE HCL/CHONDROITIN SU 500-400 MG
CAPSULE ORAL
Qty: 200 STRIP | Refills: 0 | Status: SHIPPED | OUTPATIENT
Start: 2021-01-11

## 2021-01-11 RX ADMIN — Medication 10 ML: at 08:50

## 2021-01-11 RX ADMIN — PANTOPRAZOLE SODIUM 40 MG: 40 INJECTION, POWDER, FOR SOLUTION INTRAVENOUS at 08:49

## 2021-01-11 RX ADMIN — HYDROCODONE BITARTRATE AND ACETAMINOPHEN 1 TABLET: 5; 325 TABLET ORAL at 08:49

## 2021-01-11 RX ADMIN — ACETAMINOPHEN 650 MG: 325 TABLET ORAL at 02:49

## 2021-01-11 RX ADMIN — ACETAMINOPHEN 650 MG: 325 TABLET ORAL at 12:41

## 2021-01-11 RX ADMIN — MUPIROCIN: 20 OINTMENT TOPICAL at 09:19

## 2021-01-11 RX ADMIN — OXYCODONE 5 MG: 5 TABLET ORAL at 12:43

## 2021-01-11 RX ADMIN — POTASSIUM CHLORIDE 40 MEQ: 1500 TABLET, EXTENDED RELEASE ORAL at 09:19

## 2021-01-11 RX ADMIN — FENOFIBRATE 160 MG: 160 TABLET ORAL at 08:49

## 2021-01-11 RX ADMIN — ENOXAPARIN SODIUM 40 MG: 40 INJECTION SUBCUTANEOUS at 08:49

## 2021-01-11 ASSESSMENT — PAIN SCALES - GENERAL
PAINLEVEL_OUTOF10: 8
PAINLEVEL_OUTOF10: 4
PAINLEVEL_OUTOF10: 5

## 2021-01-11 NOTE — DISCHARGE SUMMARY
Name:  Dhruv Burroughs  Room:  /9194-08  MRN:    [id number]    Discharge Summary      This discharge summary is in conjunction with a complete physical exam done on the day of discharge. Attending Physician: Dr. Adia Sidhu  Discharging Physician: Dr. Val Luna: 2021  Discharge:   2021    HPI:  The patient is a 39 y.o. male with a PMH of Type II DM - diet controlled who presented to the ED with complaint of epigastric abdominal pain that started around 330 AM on 2021 with associated nausea. No emesis, diarrhea, constipation, melena or hematochezia. No exacerbating or alleviating factors. Last BM was this morning. Denies any hx of PUD, pancreatitis or similar symptoms. He drinks alcohol \"on occasion\" but not regularly. Denies any knowledge of hypertriglyceridemia. He does have a gallbladder but denies any hx of cholelithiasis. He is pre-diabetic which he reports is managed by diet. He does not smoke. The only medication he takes is Nexium. Diagnoses this Admission and Hospital Course   Acute Pancreatitis 2/2  Hypertriglyceridemia  - denies alcohol abuse  - Admitted to Med Surg   - [facility] U/S - showed fatty liver, trigs - >4,425  - medications reviewed  - NPO-->clear liquid-->advanced to low fat diet, IVF, PRN Dilaudid-->added norco, PRN Zofran  - GI consulted  - transfered to ICU for insulin gtt, started Fenofibrate   - plasmapheresis done on  and  by nephrology  - triglyceride improved from 4425-->1149-->423-->241  D/c on Fenofibrate. Close f/u with PCP.      Hyponatremia  - in setting of hyperglycemia and hypertriglyceridemia  - improved to 134  - mgmt of sugars as below, IVF     Leukocytosis  - w/ no obvious source of infection  - favor reactive  - repeat CBC  - improved to 13     New Onset Type II DM - not in DKA  - pt states he was pre-diabetic prior to this stay  - checked Hgb A1c - 8.9  - on low dose SSI   - we started lantus/metformin  D/c on Metformin.   - needs close f/u with PCP     GERD  - cont'd PPI     Obesity  - Body mass index is 34.97 kg/m². - Counseled on weight loss.         DVT Prophylaxis: Lovenox    Procedures (Please Review Full Report for Details)  Right IJ placement    Consults    Nephrology  Pulmonology  GI      Physical Exam at Discharge:    /88   Pulse 97   Temp 97.5 °F (36.4 °C) (Oral)   Resp 18   Ht 5' 8\" (1.727 m)   Wt 261 lb (118.4 kg)   SpO2 97%   BMI 39.68 kg/m²     Gen: No distress. Alert. Eyes: PERRL. No sclera icterus. No conjunctival injection. ENT: No discharge. Pharynx clear. Neck: Trachea midline. Resp: No accessory muscle use. No crackles. No wheezes. No rhonchi. CV: Regular rate. Regular rhythm. No murmur. No rub. No edema. Capillary Refill: Brisk,< 3 seconds   Peripheral Pulses: +2 palpable, equal bilaterally   GI: Mild tenderness right side abdomen. Non-distended. Normal bowel sounds. No hernia. Skin: Warm and dry. No nodule on exposed extremities. No rash on exposed extremities. M/S: No cyanosis. No joint deformity. No clubbing. Neuro: Awake. Grossly nonfocal    Psych: Oriented x 3.  No anxiety or agitation    CBC:   Recent Labs     01/10/21  0510 01/10/21  1907   WBC 12.8* 13.1*   HGB 13.9 13.8   HCT 42.3 40.6   MCV 86.8 84.8    215     BMP:   Recent Labs     01/09/21  1652 01/10/21  0510 01/11/21  0550   * 134* 133*   K 3.8 3.6 3.1*    99 96*   CO2 22 23 24   PHOS 2.1* 1.5* 2.1*   BUN 8 9 8   CREATININE <0.5* <0.5* <0.5*     LIVER PROFILE:   Recent Labs     01/09/21  0505 01/10/21  0510 01/11/21  0550   AST 9* 8* 11*   ALT 11 6* 11   LIPASE 77.0* 42.0  --    BILIDIR 0.3 <0.2 <0.2   BILITOT 0.9 0.6 0.6   ALKPHOS 40 44 64     PT/INR:   Recent Labs     01/08/21  1228   PROTIME 13.6*   INR 1.17*       CULTURES  COVID: not detected    RADIOLOGY  IR NONTUNNELED VASCULAR CATHETER > 5 YEARS   Final Result   Successful ultrasound and fluoroscopy guided non-tunneled right internal   jugular dialysis catheter placement. US GALLBLADDER RUQ   Final Result   Fatty infiltration of the liver. CT ABDOMEN PELVIS W IV CONTRAST Additional Contrast? None   Final Result   1. Acute pancreatitis. 2. Diverticulosis without scan evidence for diverticulitis. Discharge Medications     Medication List      START taking these medications    blood glucose test strips  Test 3 times a day & as needed for symptoms of irregular blood glucose. Dispense sufficient amount for indicated testing frequency plus additional to accommodate PRN testing needs. fenofibrate 160 MG tablet  Commonly known as: TRIGLIDE  Take 1 tablet by mouth daily  Start taking on: January 12, 2021     FreeStyle Lancets Misc  1 each by Does not apply route daily     glucose monitoring kit monitoring kit  1 kit by Does not apply route daily     metFORMIN 1000 MG tablet  Commonly known as: GLUCOPHAGE  Take 1 tablet by mouth 2 times daily (with meals)     oxyCODONE 5 MG immediate release tablet  Commonly known as: ROXICODONE  Take 1 tablet by mouth every 6 hours as needed for Pain for up to 3 days. CONTINUE taking these medications    esomeprazole Magnesium 20 MG Pack  Commonly known as: [employer]           Where to Get Your Medications      These medications were sent to [address] [place] [address] [address], [address] [address]    Phone: [phone]   · blood glucose test strips  · fenofibrate 160 MG tablet  · FreeStyle Lancets Misc  · glucose monitoring kit monitoring kit  · metFORMIN 1000 MG tablet     You can get these medications from any pharmacy    Bring a paper prescription for each of these medications  · oxyCODONE 5 MG immediate release tablet           Discharged in stable condition to home. Follow Up: Follow up with PCP.

## 2021-01-11 NOTE — PROGRESS NOTES
Nutrition Education    RD consulted for carb control and low-fat + pancreatitis diet education. Patient's wife was on speaker phone during visit with patient so she could hear education information. Patient is from home where he lives with his wife. He reported, \"my blood sugars were under control but I missed a few appointments and didn't realize that things could get like this. \" Patient also stated that he did not know that TG played a large part in his health and how other organs could be affected by elevated TG. Patient and his wife prepare most of their meals at home and consume a variety of foods. This RD reviewed diabetes education + low-fat and pancreatitis education materials with patient and his wife on speaker phone. This RD answered questions from patient and his wife during visit. Encouraged patient and/or his wife to call/email RD with any nutrition-related questions or concerns after d/c, if need be. · Verbally reviewed information with PATIENT + WIFE WAS ON SPEAKER PHONE DURING VISIT. · Educated on 1/11/21  · Written educational materials provided. · Contact name and number provided. · Refer to Patient Education activity for more details.     Electronically signed by Mark Anthony Otto RD, LD on 1/11/21 at 3:24 PM EST    Contact: [phone]

## 2021-01-11 NOTE — PROGRESS NOTES
Shift assessment completed see flow sheet. C/o tenderness in back and  Abd. In bed with call light in reach. 2047:  PRN tylenol given, see mar.   0477: PRN Norco given, see mar. Daughter updated on pt, verbal consent given via consent. 60965: PRN tylenol given, see mar.

## 2021-01-11 NOTE — PROGRESS NOTES
The pain related to pancreatitis is gone. No tenderness in the epigastrium or LUQ. The pt is uncomfortable in the hospital bed and has back issues and pain in the right flank for which he needs pain meds. Discussed the situation with the patient and his nurse. He can be discharged home and he can f/u with PCP in 1-2 weeks and with Dr. Tiny Canada in 3-4 weeks. The total time spent face-to-face and on the wilkinson during this visit was 15 minutes with more than 50% of the time spent in coordination of care and counseling the patient about the medical conditions and their management.

## 2021-01-11 NOTE — PROGRESS NOTES
[facility]       Progress Note           Subjective/   39y.o. year old male who we are seeing in consultation for hypertriglyceridemia. HPI:  Underwent PLEX on 1/8, 1/9. States abdominal pain much better, 2/10. ROS:  Intake better, no sob. Objective/   GEN:  Chronically ill, /88   Pulse 97   Temp 97.5 °F (36.4 °C) (Oral)   Resp 18   Ht 5' 8\" (1.727 m)   Wt 261 lb (118.4 kg)   SpO2 97%   BMI 39.68 kg/m²   HEENT: non-icteric, no JVD  CV: S1, S2 without m/r/g; no LE edema  RESP: CTA B without w/r/r; breathing wnl  ABD: +bs, soft, nt, no hsm  SKIN: warm, no rashes  [facility] ([facility]    Data/  Recent Labs     01/10/21  0510 01/10/21  1907   WBC 12.8* 13.1*   HGB 13.9 13.8   HCT 42.3 40.6   MCV 86.8 84.8    215     Recent Labs     01/09/21  0505 01/09/21  0505 01/09/21  1652 01/10/21  0510 01/11/21  0550   *   < > 135* 134* 133*   K 3.3*   < > 3.8 3.6 3.1*   CL 94*   < > 102 99 96*   CO2 22   < > 22 23 24   GLUCOSE 226*   < > 230* 211* 219*   PHOS 1.3*   < > 2.1* 1.5* 2.1*   MG 1.70*  --   --  2.00 2.20   BUN 10   < > 8 9 8   CREATININE <0.5*   < > <0.5* <0.5* <0.5*   LABGLOM >60   < > >60 >60 >60   GFRAA >60   < > >60 >60 >60    < > = values in this interval not displayed.        Assessment/     - Hypertriglyceridemia c/b acute pancreatitis   S/p PLEX on 1/8, 1/9     - Pseudohyponatremia - correction for high triglycerides results in SNa ~135     - DM - diet controlled     - Fatty liver     - Hypertension - trending higher with pain, better now    Plan/     - Trend TG levels - overall symptoms better with two PLEX treatments, no further plans for PLEX  - Likely to need chronic medical treatment as outpatient for DM & TG levels  - Prn K replacement - trend electrolytes    Okay for discharge  Okay to remove vascath  No further recommendations at this time  Will sign off for now  Call with questions

## 2022-10-14 ENCOUNTER — HOSPITAL ENCOUNTER (OUTPATIENT)
Dept: GENERAL RADIOLOGY | Age: 47
Discharge: HOME | End: 2022-10-14
Payer: COMMERCIAL

## 2022-10-14 DIAGNOSIS — G89.29 CHRONIC LEFT SHOULDER PAIN: ICD-10-CM

## 2022-10-14 DIAGNOSIS — M25.512 CHRONIC LEFT SHOULDER PAIN: ICD-10-CM

## 2022-10-14 DIAGNOSIS — L98.9 SKIN LESION: ICD-10-CM

## 2022-10-14 PROCEDURE — 73590 X-RAY EXAM OF LOWER LEG: CPT

## 2022-10-14 PROCEDURE — 73030 X-RAY EXAM OF SHOULDER: CPT
